# Patient Record
Sex: MALE | Race: WHITE | Employment: OTHER | URBAN - METROPOLITAN AREA
[De-identification: names, ages, dates, MRNs, and addresses within clinical notes are randomized per-mention and may not be internally consistent; named-entity substitution may affect disease eponyms.]

---

## 2017-01-04 ENCOUNTER — TELEPHONE (OUTPATIENT)
Dept: FAMILY MEDICINE | Facility: OTHER | Age: 67
End: 2017-01-04

## 2017-01-04 ENCOUNTER — ANTICOAGULATION THERAPY VISIT (OUTPATIENT)
Dept: ANTICOAGULATION | Facility: OTHER | Age: 67
End: 2017-01-04
Payer: COMMERCIAL

## 2017-01-04 ENCOUNTER — TRANSFERRED RECORDS (OUTPATIENT)
Dept: HEALTH INFORMATION MANAGEMENT | Facility: CLINIC | Age: 67
End: 2017-01-04

## 2017-01-04 DIAGNOSIS — Z86.711 HISTORY OF PULMONARY EMBOLISM: ICD-10-CM

## 2017-01-04 DIAGNOSIS — Z86.711 HISTORY OF PULMONARY EMBOLISM: Primary | ICD-10-CM

## 2017-01-04 DIAGNOSIS — Z79.01 LONG-TERM (CURRENT) USE OF ANTICOAGULANTS: Primary | ICD-10-CM

## 2017-01-04 LAB
INR PPP: 1.9
INR PPP: 1.9

## 2017-01-04 PROCEDURE — 99207 ZZC NO CHARGE NURSE ONLY: CPT | Performed by: FAMILY MEDICINE

## 2017-01-04 NOTE — TELEPHONE ENCOUNTER
Called patient and gave him his results below. Patient states that every Friday they put his pills in his box and he takes them every day, including his statin. He also stated that this week he has a heart test at the hospital, he is not sure what the test is called. He just wanted Dr. Hernandez to know. Patient said he has an appt with Dr. Hernandez at the end of the month and is wondering if you can recheck his liver enzymes then?    Notes Recorded by Gordo Hernandez MD on 1/4/2017 at 2:56 PM  All of your labs were normal for you except your cholesterol is a bit worse.  Have you missed your statin dose lately?  Your liver enzymes were also elevated.  This might be related to your heart failure.  We should recheck this within a few months to be sure it's not worsening.    Thanks,    Dr. David Rodriges MA

## 2017-01-04 NOTE — PROGRESS NOTES
ANTICOAGULATION FOLLOW-UP CLINIC VISIT    Patient Name:  Ric Ferrari  Date:  1/4/2017  Contact Type:  Telephone    SUBJECTIVE:     Patient Findings     Comments [facility] at [facility]           OBJECTIVE    INR   Date Value Ref Range Status   01/04/2017 1.9  Final       ASSESSMENT / PLAN  INR assessment THER    Recheck INR In: 3 WEEKS    INR Location Outside lab      Anticoagulation Summary as of 1/4/2017     INR goal 2.0-3.0   Selected INR 1.9! (1/4/2017)   Maintenance plan 10 mg (5 mg x 2) on Fri; 7.5 mg (5 mg x 1.5) all other days   Full instructions 10 mg on Fri; 7.5 mg all other days   Weekly total 55 mg   Plan last modified Rubi Medina RN (1/4/2017)   Next INR check 1/25/2017   Target end date     Indications   Long-term (current) use of anticoagulants [Z79.01] [Z79.01]  History of pulmonary embolism [Z86.711]         Anticoagulation Episode Summary     INR check location     Preferred lab     Send INR reminders to [facility] POOL    Comments 5 mg tablets, pm dose      Anticoagulation Care Providers     Provider Role Specialty Phone number    Margi Figueroa MD [facility] [phone]            See the Encounter Report to view Anticoagulation Flowsheet and Dosing Calendar (Go to Encounters tab in chart review, and find the Anticoagulation Therapy Visit)    Dosage adjustment made based on physician directed care plan.    Rubi Medina RN

## 2017-01-04 NOTE — TELEPHONE ENCOUNTER
[facility] called. Patient has a standing order for INR point of care, but needs an order for INR lab.   Order placed and faxed to clinic at [phone].   Notified to please fax results to [facility].   Monserrat Dickerson RN

## 2017-01-04 NOTE — TELEPHONE ENCOUNTER
Standing orders for INR point of care faxed to [facility] lab. Fax # [phone]  Attempted to contact patient at home to verify that he was going for lab only, but no answer.   Monserrat Dickerson RN

## 2017-01-04 NOTE — TELEPHONE ENCOUNTER
Patient is having his INR done at the  [facility] Can you please fax orders for that to be done?  He did not have the fax number or the name of anyone to send it to. He just said he is one his way there and he wants to get this done ASAP

## 2017-01-06 ENCOUNTER — TELEPHONE (OUTPATIENT)
Dept: NURSING | Facility: CLINIC | Age: 67
End: 2017-01-06

## 2017-01-06 ENCOUNTER — TRANSFERRED RECORDS (OUTPATIENT)
Dept: HEALTH INFORMATION MANAGEMENT | Facility: CLINIC | Age: 67
End: 2017-01-06

## 2017-01-06 LAB — EJECTION FRACTION: 38

## 2017-01-07 NOTE — TELEPHONE ENCOUNTER
"Call Type: Triage Call    Presenting Problem: Patient had a \"shaky\" episode throughout the  evening till 9pm. Patient states he thinks he is drinking to many  sugary supplemental drinks and that he drank about 8 cups of water  that made him feel better. Patient denies any symptoms at this time.  Triaged for tremor/Disposition to see provider within 72 hours.  Triage Note:  Guideline Title: Tremor  Recommended Disposition: See Provider within 72 Hours  Original Inclination: Wanted to speak with a nurse  Override Disposition:  Intended Action: See Dr/Make Appt  Physician Contacted: No  Any tremor AND no prior evaluation ?  YES  Diagnosed Parkinson's AND tremor suddenly worsening ? NO  Recent seizure OR known seizure disorder ? NO  Sudden change in mental status ? NO  Behavior is combative or violent ? NO  Known chronic liver disease with new tremors that are worse with activity or are  described as flapping ? NO  New onset of tremors at rest associated with difficulty walking (slowness,  stiffness, shuffling steps, loss of balance) ? NO  Seizure hasn't stopped (continuous) OR does not fully awaken between seizures ? NO  New onset of tremors and has recently stopped drinking alcohol after a long  history of use; may also have nausea/vomiting; rapid pulse (more than 120 beats  per minute at rest) ? NO  New onset of tremors AND taking thyroid, asthma or emphysema medication OR has  recently started, stopped or changed dosage of prescription, nonprescription or  alternative/complementary medication(s) within last 7 days. ? NO  First seizure or probable first seizure AND remains not fully alert, confused,  disoriented, or combative 5 minutes or more after seizure has stopped. ? NO  New onset of tremors AND known or strongly suspected use or misuse of alcohol,  illicit or prescribed drug or other substance(s). ? NO  Physician Instructions:  Care Advice: Protect the patient from falling or other harm.  Eliminate or limit use " of caffeine, alcohol or tobacco products.  Call provider if symptoms worsen or new symptoms develop.  CAUTIONS  Some prescription or over-the-counter medications, and herbal or  alternative supplement may cause these symptoms.  SYMPTOM / CONDITION MANAGEMENT  List, or take, all current prescription(s), nonprescription or alternative  medication(s) to provider for evaluation.  Provide additional support to make activities of daily living easier, such  as sit in a high back chair when eating or stabilizing the elbow on the  tabletop. Use adaptive devices like long-handled shoehorns and button  fasteners. Use both hands to accomplish tasks.  DO NOT drive or operate dangerous equipment until condition evaluated.

## 2017-01-10 ENCOUNTER — TELEPHONE (OUTPATIENT)
Dept: FAMILY MEDICINE | Facility: OTHER | Age: 67
End: 2017-01-10

## 2017-01-10 NOTE — TELEPHONE ENCOUNTER
Reason for call:  Patient states returning call to [facility] and that he talks to her and she knows what it is regarding.  [phone]

## 2017-01-10 NOTE — TELEPHONE ENCOUNTER
Pt calling. He would like a call with his echocardiogram results. He states he had this done at [facility] in [place] last Friday. They told him they would send the results yesterday.   Thank you,  Corinna Cazares- Pt Rep.

## 2017-01-10 NOTE — TELEPHONE ENCOUNTER
"Spoke with patient.  Informed him of providers notes below.  Will follow up in clinic on the 25th, but says \"now I have to conserve his money\"    Carl Schwartz RN        "

## 2017-01-10 NOTE — TELEPHONE ENCOUNTER
I don't have results in front of me as they were sent to scanning, but his heart function is dramatically better, EF is about 40%.  It looks great for him.

## 2017-01-10 NOTE — TELEPHONE ENCOUNTER
Spoke with patient.  Double checked what previous note had said  No further questions.    Carl Schwartz RN

## 2017-01-16 ENCOUNTER — TELEPHONE (OUTPATIENT)
Dept: NURSING | Facility: CLINIC | Age: 67
End: 2017-01-16

## 2017-01-17 ENCOUNTER — TELEPHONE (OUTPATIENT)
Dept: FAMILY MEDICINE | Facility: OTHER | Age: 67
End: 2017-01-17

## 2017-01-17 DIAGNOSIS — E11.49 DIABETES MELLITUS TYPE 2 WITH NEUROLOGICAL MANIFESTATIONS (H): Primary | ICD-10-CM

## 2017-01-17 RX ORDER — GABAPENTIN 400 MG/1
800 CAPSULE ORAL 4 TIMES DAILY
Qty: 720 CAPSULE | Refills: 3 | Status: SHIPPED | OUTPATIENT
Start: 2017-01-17 | End: 2017-01-19

## 2017-01-17 NOTE — TELEPHONE ENCOUNTER
[facility] calling wondering what directions to go with for the Gabapentin. Take 2 Capsules (800mg) by mouth 4 times daily or take one capsule by mouth 4 times daily in the morning, noon, evening, and bedtime? The RX that was sent over had both on there. Provider please advise.     Sanam Rodriges MA

## 2017-01-17 NOTE — TELEPHONE ENCOUNTER
Call Type: Triage Call    Presenting Problem: Questions about Neurontin medication.  Triage Note:  Guideline Title: Medication Questions - Adult  Recommended Disposition: Provide Health Information  Original Inclination: Wanted to speak with a nurse  Override Disposition:  Intended Action: Follow advice given  Physician Contacted: No  Caller has medication question(s) that was answered with available resources ?  YES  Pregnant and has medication questions regarding prescribed and/or nonprescribed  medication(s) not covered by available resources ? NO  Breastfeeding and has medication questions regarding prescribed and/or  nonprescribed medication(s) not covered by available resources ? NO  Requested information on how to safely dispose of  or unused medications ?  NO  Sign(s) or symptom(s) associated with a diagnosed condition or with a new illness  ? NO  Prescription ordered today and not available at pharmacy putting patient at  clinical risk ? NO  Recurrence of a symptom(s) or illness post prescribed medication treatment AND  provider instructed patient to call if symptom(s) returned. ? NO  Unable to obtain prescribed medication related to available resources AND  situation poses immediate clinical risk ? NO  Pharmacy calling to clarify prescription order. ? NO  Requests refill of prescribed medication that does NOT have a valid refill; lack  of medication may cause clinical risk to patient if not available. ? NO  Has questions about prescribed and/or nonprescribed medications not covered by  available resources ? NO  Pharmacy calling with prescription question; answered per department policy. ? NO  Requests refill of prescribed medication without valid refills OR requests refill  of prescribed medication with valid refills but does not have prescription number  (no RX container); lack of medication does not put patient at clinical risk ? NO  Physician Instructions:  Care Advice:

## 2017-01-18 ENCOUNTER — TELEPHONE (OUTPATIENT)
Dept: FAMILY MEDICINE | Facility: OTHER | Age: 67
End: 2017-01-18

## 2017-01-18 ENCOUNTER — OFFICE VISIT (OUTPATIENT)
Dept: PEDIATRICS | Facility: CLINIC | Age: 67
End: 2017-01-18
Payer: COMMERCIAL

## 2017-01-18 ENCOUNTER — TELEPHONE (OUTPATIENT)
Dept: NURSING | Facility: CLINIC | Age: 67
End: 2017-01-18

## 2017-01-18 VITALS
WEIGHT: 304 LBS | BODY MASS INDEX: 35.17 KG/M2 | DIASTOLIC BLOOD PRESSURE: 70 MMHG | HEART RATE: 80 BPM | TEMPERATURE: 96.7 F | HEIGHT: 78 IN | SYSTOLIC BLOOD PRESSURE: 116 MMHG | OXYGEN SATURATION: 96 %

## 2017-01-18 DIAGNOSIS — M79.675 PAIN OF TOE OF LEFT FOOT: Primary | ICD-10-CM

## 2017-01-18 PROCEDURE — 99212 OFFICE O/P EST SF 10 MIN: CPT | Performed by: INTERNAL MEDICINE

## 2017-01-18 NOTE — TELEPHONE ENCOUNTER
"Call Type: Triage Call    Presenting Problem: \"My big toe has been hurting.\" Symptoms starting  yesterday 1/17/17. Reporting great toe pain. Denies any  discoloration. History of Diabetes.  Triage Note:  Guideline Title: Diabetes: Foot Problems  Recommended Disposition: Call Provider within 8 Hours  Original Inclination: Did not know what to do  Override Disposition:  Intended Action: Follow advice given  Physician Contacted: No  New or worsening pain, tenderness or swelling ?  YES  Foreign body that can't be removed ? NO  New onset enlarged or swollen joint AND difficult to move ? NO  New unbearable foot (feet) pain ? NO  New onset foot drop or weakness with sensory changes (numbness, tingling) ? NO  New onset of severe pain AND change in sensation (numb, tingling, or no feeling),  change in color (pale or blue), feels cool to touch compared to other extremity.  ? NO  Any full thickness puncture wound (passes through the skin layers of epidermis and  dermis and penetrates into the underlying subcutaneous tissue) OR a puncture  wound where the bottom of wound is not visible (even when wound edges are  ) ? NO  Cut, abrasion, or laceration is the primary concern ? NO  Puncture wound is the primary concern ? NO  Any new OR worsening signs and symptoms of soft tissue infection ? NO  Any signs and symptoms of soft tissue infection that have not improved with 48  hours of medical care ? NO  Trauma without associated abrasion, laceration or puncture wound***GO TO  GUIDELINE*** Foot Injury ? NO  Prior injury and history of or potential for compromised circulation (diabetes,  peripheral vascular disease, varicose veins, peripheral edema, etc) ? NO  Physician Instructions:  Care Advice: DO NOT attempt to place any weight on the affected extremity  until evaluated by provider.  "

## 2017-01-18 NOTE — NURSING NOTE
"Chief Complaint   Patient presents with     Musculoskeletal Problem     left foot       Initial /70 mmHg  Pulse 80  Temp(Src) 96.7  F (35.9  C) (Oral)  Ht 6' 6\" (1.981 m)  Wt 304 lb (137.893 kg)  BMI 35.14 kg/m2  SpO2 96% Estimated body mass index is 35.14 kg/(m^2) as calculated from the following:    Height as of this encounter: 6' 6\" (1.981 m).    Weight as of this encounter: 304 lb (137.893 kg).  BP completed using cuff size: asael Beasley CMA    "

## 2017-01-18 NOTE — PROGRESS NOTES
SUBJECTIVE:                                                    Ric Ferrari is a 66 year old male who presents to clinic today for the following health issues:      Joint Pain     Onset: Yesterday     Description:   Location: left foot   Character: Dull ache, sharp when hit    Intensity: mild, moderate    Progression of Symptoms: same    Accompanying Signs & Symptoms:  Other symptoms: none, pt. States both feet are always numb due to diabetes   History:   Previous similar pain: YES      Precipitating factors:   Trauma or overuse: no     Alleviating factors:  Improved by: nothing       Therapies Tried and outcome: Nothing      HPI:  Pain in left great toe.This began at 5 AM this morning. No known injury but does have numbness in both feet due to diabetic neuropathy    PMH:   Patient Active Problem List   Diagnosis     Insomnia     Diabetic peripheral neuropathy (H)     Bipolar disorder (H)     Accelerated hypertension     Hyperlipidemia LDL goal <100     CAD (coronary artery disease)     Hypothyroidism     TBI (traumatic brain injury) (H)     Type 2 diabetes, HbA1C goal < 8% (H)     Advanced directives, counseling/discussion     Major depressive disorder, recurrent episode, in partial or unspecified remission     Other and unspecified factitious illness     Personality disorder     Generalized weakness     Falls frequently     Conversion disorder     Atypical chest pain     LLQ abdominal pain     Gallstones     DVT prophylaxis     Ischemic cardiomyopathy     MRSA in [facility] 4/13     Weakness     Dizziness     Ataxia - chronic     Tremor - chronic     Diaphoresis     Abnormal urinalysis ?UTI     Psychiatric illness     Hypoglycemia     Syncope     Coronary atherosclerosis of native coronary artery     Old myocardial infarction     Chest heaviness     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)     Left bundle branch block     Neutrophilic leukocytosis     Serotonin syndrome     Leg weakness,  bilateral     Hyperglycemia     Anxiety     Accelerating angina (H)     CAD S/P percutaneous coronary angioplasty     Acute on chronic systolic heart failure (H)     Insomnia     Left arm pain     Right leg pain     Observed sleep apnea     History of pulmonary embolism     Chronic pain     Long term current use of anticoagulant therapy     Chronic pain syndrome     Long-term (current) use of anticoagulants [Z79.01]     Diastolic congestive heart failure, unspecified congestive heart failure chronicity (H)     S/P cardiac pacemaker procedure     Chronic diastolic congestive heart failure (H)     Health Care Home     Past Surgical History   Procedure Laterality Date     Orthopedic surgery       right index finger partial amputation     Stent, coronary, ana         Social History   Substance Use Topics     Smoking status: Former Smoker     Types: Cigars     Quit date: 10/01/2013     Smokeless tobacco: Never Used      Comment: 1 cigar a week     Alcohol Use: No     Family History   Problem Relation Age of Onset     DIABETES Paternal Uncle           Hypertension Father      CEREBROVASCULAR DISEASE Father           HEART DISEASE Father      MI     Alcohol/Drug Father      Neurologic Disorder Father      seizures     Alcohol/Drug Brother      Alcohol/Drug Sister      Alzheimer Disease Mother          Current Outpatient Prescriptions   Medication Sig Dispense Refill     gabapentin (NEURONTIN) 400 MG capsule Take 2 capsules (800 mg) by mouth 4 times daily 720 capsule 3     atorvastatin (LIPITOR) 10 MG tablet Take 1 tablet (10 mg) by mouth daily 90 tablet 1     levothyroxine (SYNTHROID/LEVOTHROID) 75 MCG tablet TAKE ONE TABLET BY MOUTH ONCE DAILY 30 MINUTES BEFORE BREAKFAST 90 tablet 0     lisinopril (PRINIVIL/ZESTRIL) 2.5 MG tablet Take 1 tablet (2.5 mg) by mouth daily 90 tablet 3     metFORMIN (GLUCOPHAGE) 1000 MG tablet Take 1 tablet (1,000 mg) by mouth 2 times daily (with meals) 180 tablet 1      metoprolol (TOPROL-XL) 25 MG 24 hr tablet TAKE 1/2 TABLET (12.5MG) BY MOUTH TWO TIMES A DAY 90 tablet 1     sotalol (BETAPACE) 80 MG tablet Take 1 tablet (80 mg) by mouth daily 90 tablet 1     spironolactone (ALDACTONE) 25 MG tablet TAKE ONE-HALF TABLET BY MOUTH EVERY MORNING 45 tablet 1     warfarin (COUMADIN) 5 MG tablet Take 5 mg (1 tab) Tuesday and 7.5 mg (1.5 tab) all other days or as directed by Coumadin clinic.  NEED INR FOR ADDITIONAL REFILLS. 40 tablet 0     warfarin (COUMADIN) 5 MG tablet TAKE 1 TABLETS (5 mg)  BY MOUTH ON Tuesday and TAKE 1.5 TABLETs (7.5 mg)  ON ALL OTHER DAYS OF THE WEEK OR AS DIRECTED BY THE COUMADIN CLINIC 192 tablet 0     gabapentin (NEURONTIN) 400 MG capsule TAKE ONE CAPSULE BY MOUTH FOUR TIMES A  capsule 1     clonazePAM (KLONOPIN) 0.5 MG tablet Take 1 tablet (0.5 mg) by mouth 3 times daily as needed for anxiety 90 tablet 1     order for DME Equipment being ordered: Diabetic Shoes, 1 pair 1 Device 0     order for DME Equipment being ordered: Lift Chair 1 Device 0     PARoxetine (PAXIL) 20 MG tablet 30 mg        lamoTRIgine (LAMICTAL) 100 MG tablet        guaiFENesin (MUCINEX) 600 MG 12 hr tablet Take 1,200 mg by mouth 2 times daily       cetirizine (ZYRTEC) 10 MG tablet Take 10 mg by mouth daily       dextromethorphan (DELSYM) 30 MG/5ML liquid Take 60 mg by mouth 2 times daily       omeprazole (PRILOSEC) 20 MG capsule TAKE ONE CAPSULE BY MOUTH ONCE DAILY 90 capsule 2     torsemide (DEMADEX) 20 MG tablet        furosemide (LASIX) 20 MG tablet TAKE 2 TABLETS BY MOUTH IN THE MORNING AND 1 TABLET BY MOUTH IN THE EVENING 90 tablet 9     ACCU-CHEK SMARTVIEW test strip USE TO TEST BLOOD SUGARS 4 TIMES DAILY BEFORE MEALS AND AT BEDTIME 400 each 2     order for DME Equipment being ordered: Nebulizer 1 Device 0     order for DME Equipment being ordered: Nebulizer 1 Device 0     blood glucose monitoring (ACCU-CHEK SMARTVIEW) test strip Use to test blood sugar 4 times daily before meals  "and bedtime 400 each 5     blood glucose monitoring (ACCU-CHEK MULTICLIX) lancets Use to test blood sugar 4 times daily. 1 Box 5     Docusate Sodium (COLACE PO) Take by mouth as needed for constipation       polyethylene glycol (MIRALAX/GLYCOLAX) powder Take 1 capful by mouth as needed for constipation       risperiDONE (RISPERDAL) 2 MG tablet TAKE ONE TABLET BY MOUTH AT BEDTIME 30 tablet 2     insulin pen needle (B-D U/F) 31G X 8 MM Use with insulin daily or as directed. 100 each 0     multivitamin, therapeutic with minerals (MULTI-VITAMIN) TABS Take 1 tablet by mouth daily (Patient taking differently: Take 1 tablet by mouth daily AT NOON) 100 tablet 3     acetaminophen (TYLENOL) 325 MG tablet Take 2 tablets (650 mg) by mouth every 4 hours as needed for mild pain (Patient taking differently: Take 500 mg by mouth every 4 hours as needed for mild pain ) 100 tablet      [DISCONTINUED] insulin aspart (NOVOLOG PEN) 100 UNIT/ML soln 2 units/carb with meals subcutaneously 1 Month 0     Allergies   Allergen Reactions     No Known Drug Allergies        ROS:  C: NEGATIVE for fever, chills, change in weight    OBJECTIVE:                                                    /70 mmHg  Pulse 80  Temp(Src) 96.7  F (35.9  C) (Oral)  Ht 6' 6\" (1.981 m)  Wt 304 lb (137.893 kg)  BMI 35.14 kg/m2  SpO2 96%  Body mass index is 35.14 kg/(m^2).  GENERAL: healthy, alert and no distress  Diabetic Left  foot exam: normal DP and PT pulses, reduced sensation , dry cracking heels, onychomycosis and nail exam nails are thickened. No redness or tenderness    Diagnostic Test Results:  none      ASSESSMENT/PLAN:                                                    1. Pain of toe of left foot    No sign of infection.  This patient is in need of podiatry care. He lives in [place] and he wanted to see his podiatrist near his home and I urged a urgent appointment.          Robert Mosqueda MD  [facility]    "

## 2017-01-18 NOTE — TELEPHONE ENCOUNTER
Was seen with DESIREE  Gabapentin calling in to say that it works great.  Advised him to take as provider told him.   One capsule 4 times daily and 2 at bedtime.    Carl Schwartz RN

## 2017-01-19 ENCOUNTER — TELEPHONE (OUTPATIENT)
Dept: FAMILY MEDICINE | Facility: OTHER | Age: 67
End: 2017-01-19

## 2017-01-19 DIAGNOSIS — E11.49 DIABETES MELLITUS TYPE 2 WITH NEUROLOGICAL MANIFESTATIONS (H): Primary | ICD-10-CM

## 2017-01-19 RX ORDER — GABAPENTIN 400 MG/1
400-800 CAPSULE ORAL 4 TIMES DAILY
Qty: 540 CAPSULE | Refills: 3 | Status: SHIPPED | OUTPATIENT
Start: 2017-01-19

## 2017-01-19 NOTE — TELEPHONE ENCOUNTER
Gabapentin 400 mg [facility]  Pharmacy sent fax that Pt states his dose should be:  1 am, 1 noon, 1 pm, and 2 qhs Please verify...    * ins. Will only pay for max 6/day

## 2017-01-20 DIAGNOSIS — E11.49 DIABETES MELLITUS TYPE 2 WITH NEUROLOGICAL MANIFESTATIONS (H): Primary | ICD-10-CM

## 2017-01-20 RX ORDER — GABAPENTIN 400 MG/1
400-800 CAPSULE ORAL 4 TIMES DAILY
Qty: 540 CAPSULE | Refills: 3 | Status: CANCELLED | OUTPATIENT
Start: 2017-01-20

## 2017-01-20 NOTE — TELEPHONE ENCOUNTER
[facility],  URGENT Patient needs ASAP, please verify Dosage Patient states his dose should be: 1am, 1 noon, 1pm, & 2qhs., please verify, Insurance will only pay for max 6/day.    Gabapentin 400 MG      Last Written Prescription Date:  11/25/2016  Last Fill Quantity: 30,   # refills: 1  Last Office Visit with [facility] prescribing provider: 1/18/2017  Future Office visit:    Next 5 appointments (look out 90 days)     Jan 25, 2017 11:15 AM   [facility]    [address] [id number] [phone]                   Routing refill request to provider for review/approval because:  Drug not on the [facility] refill protocol or controlled substance

## 2017-01-23 NOTE — TELEPHONE ENCOUNTER
Spoke to [facility] in [place] verified that RX was sent. Everything is ok this is no longer needed.   Arlet Muhammad CMA ([facility])

## 2017-01-23 NOTE — PROGRESS NOTES
SUBJECTIVE:                                                    Ric Ferrari is a 66 year old male who presents to clinic today for the following health issues:      Shakiness  He tried stopping the anxiety medication, and this got worse.  Resumed previous dose.    He increase the gabapentin, feels much better.  His shakiness is basically gone at this point.      Still quite fatigued.  Didn't notice if his fatigue changed or not on reduce dose of clonazepam.  Fatigue isn't clearly worse with the increased gabapentin dose.    Diabetes Follow-up      Patient is checking blood sugars: not at all ( states hes not anymore)    Diabetic concerns: other - unable to have any sugar like pop or anything makes him very shaky      Symptoms of hypoglycemia (low blood sugar): shaky, dizzy, weak, blurred vision     Paresthesias (numbness or burning in feet) or sores: Yes, still some numbness.  No burning.  Gabapentin seems to work well for this.     Date of last diabetic eye exam: overdue- pt aware      Hyperlipidemia Follow-Up      Rate your low fat/cholesterol diet?: not monitoring fat    Taking statin?  Yes, no muscle aches from statin    Other lipid medications/supplements?:  none     Vascular Disease Follow-up:  Coronary Artery Disease (CAD)      Chest pain or pressure, left side neck or arm pain: No    Shortness of breath/increased sweats/nausea with exertion: pt states ye     Pain in calves walking 1-2 blocks: No    Worsened or new symptoms since last visit: No    Nitroglycerin use: no    Daily aspirin use: no he forgets      Heart Failure Follow-up    Symptoms:    Shortness of breath: none    Lower extremity edema: none    Chest pain: No    Using more pillows than normal: No    Cough at night: No    Weight:    Checking weight daily: Yes    Weight change: none    Cardiology visits, ER/UC, or hospital admissions since last visit: None    Medication side effects: none   Echo showed better function, EF to 40%.  Still with  thickened, enlarged LV.        Depression and Anxiety Follow-Up    Status since last visit: No change    Other associated symptoms:None    Complicating factors:     Significant life event: No     Current substance abuse: None    PHQ-9 SCORE 9/27/2016 12/28/2016 1/25/2017   Total Score - - -   Total Score 3 5 5     DEVORAH-7 SCORE 6/2/2016 12/28/2016 1/25/2017   Total Score 1 0 0        PHQ-9  English      PHQ-9   Any Language     GAD7           Amount of exercise or physical activity: None    Problems taking medications regularly: No    Medication side effects: none    Diet: regular (no restrictions)      Problem list and histories reviewed & adjusted, as indicated.  Additional history: as documented    Current Outpatient Prescriptions   Medication Sig Dispense Refill     gabapentin (NEURONTIN) 400 MG capsule Take 1-2 capsules (400-800 mg) by mouth 4 times daily 540 capsule 3     atorvastatin (LIPITOR) 10 MG tablet Take 1 tablet (10 mg) by mouth daily 90 tablet 1     levothyroxine (SYNTHROID/LEVOTHROID) 75 MCG tablet TAKE ONE TABLET BY MOUTH ONCE DAILY 30 MINUTES BEFORE BREAKFAST 90 tablet 0     lisinopril (PRINIVIL/ZESTRIL) 2.5 MG tablet Take 1 tablet (2.5 mg) by mouth daily 90 tablet 3     metFORMIN (GLUCOPHAGE) 1000 MG tablet Take 1 tablet (1,000 mg) by mouth 2 times daily (with meals) 180 tablet 1     metoprolol (TOPROL-XL) 25 MG 24 hr tablet TAKE 1/2 TABLET (12.5MG) BY MOUTH TWO TIMES A DAY 90 tablet 1     sotalol (BETAPACE) 80 MG tablet Take 1 tablet (80 mg) by mouth daily 90 tablet 1     spironolactone (ALDACTONE) 25 MG tablet TAKE ONE-HALF TABLET BY MOUTH EVERY MORNING 45 tablet 1     warfarin (COUMADIN) 5 MG tablet Take 5 mg (1 tab) Tuesday and 7.5 mg (1.5 tab) all other days or as directed by Coumadin clinic.  NEED INR FOR ADDITIONAL REFILLS. 40 tablet 0     warfarin (COUMADIN) 5 MG tablet TAKE 1 TABLETS (5 mg)  BY MOUTH ON Tuesday and TAKE 1.5 TABLETs (7.5 mg)  ON ALL OTHER DAYS OF THE WEEK OR AS DIRECTED BY  THE COUMADIN CLINIC 192 tablet 0     gabapentin (NEURONTIN) 400 MG capsule TAKE ONE CAPSULE BY MOUTH FOUR TIMES A  capsule 1     clonazePAM (KLONOPIN) 0.5 MG tablet Take 1 tablet (0.5 mg) by mouth 3 times daily as needed for anxiety 90 tablet 1     order for DME Equipment being ordered: Diabetic Shoes, 1 pair 1 Device 0     order for DME Equipment being ordered: Lift Chair 1 Device 0     PARoxetine (PAXIL) 20 MG tablet 30 mg        lamoTRIgine (LAMICTAL) 100 MG tablet        guaiFENesin (MUCINEX) 600 MG 12 hr tablet Take 1,200 mg by mouth 2 times daily       cetirizine (ZYRTEC) 10 MG tablet Take 10 mg by mouth daily       dextromethorphan (DELSYM) 30 MG/5ML liquid Take 60 mg by mouth 2 times daily       omeprazole (PRILOSEC) 20 MG capsule TAKE ONE CAPSULE BY MOUTH ONCE DAILY 90 capsule 2     torsemide (DEMADEX) 20 MG tablet        furosemide (LASIX) 20 MG tablet TAKE 2 TABLETS BY MOUTH IN THE MORNING AND 1 TABLET BY MOUTH IN THE EVENING 90 tablet 9     ACCU-CHEK SMARTVIEW test strip USE TO TEST BLOOD SUGARS 4 TIMES DAILY BEFORE MEALS AND AT BEDTIME 400 each 2     order for DME Equipment being ordered: Nebulizer 1 Device 0     order for DME Equipment being ordered: Nebulizer 1 Device 0     blood glucose monitoring (ACCU-CHEK SMARTVIEW) test strip Use to test blood sugar 4 times daily before meals and bedtime 400 each 5     blood glucose monitoring (ACCU-CHEK MULTICLIX) lancets Use to test blood sugar 4 times daily. 1 Box 5     Docusate Sodium (COLACE PO) Take by mouth as needed for constipation       polyethylene glycol (MIRALAX/GLYCOLAX) powder Take 1 capful by mouth as needed for constipation       risperiDONE (RISPERDAL) 2 MG tablet TAKE ONE TABLET BY MOUTH AT BEDTIME 30 tablet 2     insulin pen needle (B-D U/F) 31G X 8 MM Use with insulin daily or as directed. 100 each 0     multivitamin, therapeutic with minerals (MULTI-VITAMIN) TABS Take 1 tablet by mouth daily (Patient taking differently: Take 1 tablet  by mouth daily AT NOON) 100 tablet 3     acetaminophen (TYLENOL) 325 MG tablet Take 2 tablets (650 mg) by mouth every 4 hours as needed for mild pain (Patient taking differently: Take 500 mg by mouth every 4 hours as needed for mild pain ) 100 tablet      [DISCONTINUED] insulin aspart (NOVOLOG PEN) 100 UNIT/ML soln 2 units/carb with meals subcutaneously 1 Month 0     Recent Labs   Lab Test  12/28/16   1307  05/05/16   1132  04/05/16   1106 03/27/16 01/23/16   11/17/15   1142   10/19/15   1101   09/01/15   1108   09/12/14   1155   A1C  6.6*  6.5*   --    --    --    --   7.2*   --    --    --    --    < >  5.5   LDL  118*   --    --    --    --    --    --    --    --    --   87   --   74   HDL  39*   --    --    --    --    --    --    --    --    --   36*   --   40*   TRIG  244*   --    --    --    --    --    --    --    --    --   251*   --   142   ALT  113*   --    --   58  64   --    --    < >  75*   --   60   < >  41   CR  0.66   --   0.82  0.74  0.83   < >   --    < >  0.78   < >  0.67   < >  0.79   GFRESTIMATED  >90  Non  GFR Calc     --   >90  Non  GFR Calc    >60  >60   < >   --    < >  >90  Non  GFR Calc     < >  >90  Non  GFR Calc     < >  >90  Non  GFR Calc     GFRESTBLACK  >90   GFR Calc     --   >90   GFR Calc    >60  >60   < >   --    < >  >90   GFR Calc     < >  >90   GFR Calc     < >  >90   GFR Calc     POTASSIUM  4.5   --   3.8  4.3  4.3   < >   --    < >  4.2   < >  3.9   < >  4.3   TSH  1.56   --    --    --    --    --    --    --   2.24   < >   --    < >  5.47*    < > = values in this interval not displayed.      BP Readings from Last 3 Encounters:   01/25/17 110/62   01/18/17 116/70   12/28/16 108/64    Wt Readings from Last 3 Encounters:   01/25/17 307 lb 9.6 oz (139.526 kg)   01/18/17 304 lb (137.893 kg)   12/28/16 300 lb (136.079  "kg)               ROS:  Constitutional, HEENT, cardiovascular, pulmonary, gi and gu systems are negative, except as otherwise noted.  Severe fatigue, but not much SOB, or HUMMEL.    OBJECTIVE:                                                    /62 mmHg  Pulse 74  Temp(Src) 97.1  F (36.2  C) (Temporal)  Resp 18  Ht 6' 6\" (1.981 m)  Wt 307 lb 9.6 oz (139.526 kg)  BMI 35.55 kg/m2  SpO2 97%  Body mass index is 35.55 kg/(m^2).  GENERAL: healthy, alert and no distress  NECK: no adenopathy, no asymmetry, masses, or scars and thyroid normal to palpation  RESP: lungs clear to auscultation - no rales, rhonchi or wheezes  CV: regular rate and rhythm, normal S1 S2, no S3 or S4, no murmur, click or rub, no peripheral edema and peripheral pulses strong  ABDOMEN: soft, nontender, no hepatosplenomegaly, no masses and bowel sounds normal  MS: trace edema.  Tenderness reported over much of his body on exam    Diagnostic Test Results:  Results for orders placed or performed in visit on 01/25/17   Vitamin B12   Result Value Ref Range    Vitamin B12 865 193 - 986 pg/mL   INR point of care   Result Value Ref Range    INR Point of Care 2.1 (H) 0.86 - 1.14     *Note: Due to a large number of results and/or encounters for the requested time period, some results have not been displayed. A complete set of results can be found in Results Review.        ASSESSMENT/PLAN:                                                      BMI:   Estimated body mass index is 35.55 kg/(m^2) as calculated from the following:    Height as of this encounter: 6' 6\" (1.981 m).    Weight as of this encounter: 307 lb 9.6 oz (139.526 kg).   Weight management plan: Discussed healthy diet and exercise guidelines and patient will follow up in 3 months in clinic to re-evaluate.        ICD-10-CM    1. Chronic fatigue R53.82 Vitamin B12     INR point of care   2. Shakiness R25.1    3. Acquired hypothyroidism E03.9 levothyroxine (SYNTHROID/LEVOTHROID) 75 MCG tablet "   4. Anxiety F41.9 clonazePAM (KLONOPIN) 0.5 MG tablet   5. Long term current use of anticoagulant therapy Z79.01 CANCELED: INR   6. Diabetic peripheral neuropathy (H) E11.42    7. Ischemic cardiomyopathy I25.5      1.  I still suspect this is related to medications.  Will try cutting back on clonazepam dose again.  Will check B12.  If not working, could consider Wellbutrin or even ritalin, but I'd prefer to have psych manage that given all the sedating psych meds he's taking.  Gabapentin might be contributing, but it's also clearly helping his aches and pains.  2.  Improved.  Suspect this was related to either anxiety or his neuropathy.  Increased gabapentin has helped.  3.  Currently Controlled.  Continue current regimen.  Call/return if any problems or questions arise.   4.  Fair control.  Continue current regimen.  Call/return if any problems or questions arise.   5.  INR done today.  6.  Currently Controlled.  Continue current regimen.  Call/return if any problems or questions arise.   7.  Clinically controlled.  Continue risk factor reduction.      Patient Instructions   Thank you for visiting [facility]    Try reducing your clonazepam to twice daily again.  This may be contributing to your fatigue.  Can resume previous dose if not helping and other symptoms get worse.    One of your other psych medications might be contributing to the fatigue as well.  Either reducing one of these or adding a stimulant might help with your symptoms.  Wellbutrin would be one option.  I would be willing to add Wellbutrin to your regimen if the above isn't improving.  Can call for this if needed.      Please see me in 2  months for follow up.     You are also due for an eye exam.      If you had imaging scheduled please refer to your radiology prep sheet.    Appointment    Date_______________     Time_____________    Day:   M TU W TH    F    With____________________________    Location_________________________    If you need medication refills, please contact your pharmacy 3 days before your prescriptions runs out. If you are out of refills, your pharmacy will contact contact the clinic.    Contact us or return if questions or concerns.     -Your Care Team:  MD Monserrat Walls PA-C Folake Falaki, MD Anoshirvan Mazhari, MD Kelly White, PAULA    General information about your clinic      Clinic hours:     Lab hours:  Phone [phone]  Monday 7:30 am-7 pm    Monday 8:30 am-6:30 pm  Tuesday-Friday 7:30 am-5 pm   Tuesday-Friday 8:30 am-4:30 pm    Pharmacy hours:  Phone [phone]  Monday 8:30 am-7pm  Tuesday-Friday 8:30am-6 pm                                       Mychart assistance [phone]        We would like to hear from you, how was your visit today?    Regine Mckee  Patient Information Supervisor   Patient Care Supervisor  [facility] [place], and [facility] [place], and [facility]  [phone]           Gordo Hernandez MD, MD  [facility]

## 2017-01-25 ENCOUNTER — OFFICE VISIT (OUTPATIENT)
Dept: FAMILY MEDICINE | Facility: OTHER | Age: 67
End: 2017-01-25
Payer: COMMERCIAL

## 2017-01-25 VITALS
OXYGEN SATURATION: 97 % | SYSTOLIC BLOOD PRESSURE: 110 MMHG | RESPIRATION RATE: 18 BRPM | TEMPERATURE: 97.1 F | DIASTOLIC BLOOD PRESSURE: 62 MMHG | WEIGHT: 307.6 LBS | BODY MASS INDEX: 35.59 KG/M2 | HEART RATE: 74 BPM | HEIGHT: 78 IN

## 2017-01-25 DIAGNOSIS — Z79.01 LONG TERM CURRENT USE OF ANTICOAGULANT THERAPY: ICD-10-CM

## 2017-01-25 DIAGNOSIS — R53.82 CHRONIC FATIGUE: Primary | ICD-10-CM

## 2017-01-25 DIAGNOSIS — E03.9 ACQUIRED HYPOTHYROIDISM: ICD-10-CM

## 2017-01-25 DIAGNOSIS — I25.5 ISCHEMIC CARDIOMYOPATHY: ICD-10-CM

## 2017-01-25 DIAGNOSIS — R25.1 SHAKINESS: ICD-10-CM

## 2017-01-25 DIAGNOSIS — E11.42 DIABETIC PERIPHERAL NEUROPATHY (H): ICD-10-CM

## 2017-01-25 DIAGNOSIS — F41.9 ANXIETY: ICD-10-CM

## 2017-01-25 LAB
INR BLD: 2.1 (ref 0.86–1.14)
VIT B12 SERPL-MCNC: 865 PG/ML (ref 193–986)

## 2017-01-25 PROCEDURE — 36416 COLLJ CAPILLARY BLOOD SPEC: CPT | Performed by: FAMILY MEDICINE

## 2017-01-25 PROCEDURE — 85610 PROTHROMBIN TIME: CPT | Mod: QW | Performed by: FAMILY MEDICINE

## 2017-01-25 PROCEDURE — 82607 VITAMIN B-12: CPT | Performed by: FAMILY MEDICINE

## 2017-01-25 PROCEDURE — 99214 OFFICE O/P EST MOD 30 MIN: CPT | Performed by: FAMILY MEDICINE

## 2017-01-25 RX ORDER — LEVOTHYROXINE SODIUM 75 UG/1
TABLET ORAL
Qty: 90 TABLET | Refills: 3 | Status: SHIPPED | OUTPATIENT
Start: 2017-01-25

## 2017-01-25 RX ORDER — CLONAZEPAM 0.5 MG/1
0.5 TABLET ORAL 3 TIMES DAILY PRN
Qty: 90 TABLET | Refills: 1 | Status: SHIPPED | OUTPATIENT
Start: 2017-01-25

## 2017-01-25 ASSESSMENT — ANXIETY QUESTIONNAIRES
1. FEELING NERVOUS, ANXIOUS, OR ON EDGE: NOT AT ALL
5. BEING SO RESTLESS THAT IT IS HARD TO SIT STILL: NOT AT ALL
GAD7 TOTAL SCORE: 0
IF YOU CHECKED OFF ANY PROBLEMS ON THIS QUESTIONNAIRE, HOW DIFFICULT HAVE THESE PROBLEMS MADE IT FOR YOU TO DO YOUR WORK, TAKE CARE OF THINGS AT HOME, OR GET ALONG WITH OTHER PEOPLE: NOT DIFFICULT AT ALL
7. FEELING AFRAID AS IF SOMETHING AWFUL MIGHT HAPPEN: NOT AT ALL
2. NOT BEING ABLE TO STOP OR CONTROL WORRYING: NOT AT ALL
3. WORRYING TOO MUCH ABOUT DIFFERENT THINGS: NOT AT ALL
6. BECOMING EASILY ANNOYED OR IRRITABLE: NOT AT ALL

## 2017-01-25 ASSESSMENT — PAIN SCALES - GENERAL: PAINLEVEL: MODERATE PAIN (4)

## 2017-01-25 ASSESSMENT — PATIENT HEALTH QUESTIONNAIRE - PHQ9: 5. POOR APPETITE OR OVEREATING: NOT AT ALL

## 2017-01-25 NOTE — MR AVS SNAPSHOT
After Visit Summary   1/25/2017    Ric Ferrari    MRN: [id number]           Patient Information     Date Of Birth          1950        Visit Information        Provider Department      1/25/2017 11:15 AM Gordo Hernandez MD [facility]        Today's Diagnoses     Chronic fatigue    -  1     Shakiness         Acquired hypothyroidism         Anxiety         Long term current use of anticoagulant therapy         Diabetic peripheral neuropathy (H)         Ischemic cardiomyopathy           Care Instructions    Thank you for visiting [facility]    Try reducing your clonazepam to twice daily again.  This may be contributing to your fatigue.  Can resume previous dose if not helping and other symptoms get worse.    One of your other psych medications might be contributing to the fatigue as well.  Either reducing one of these or adding a stimulant might help with your symptoms.  Wellbutrin would be one option.  I would be willing to add Wellbutrin to your regimen if the above isn't improving.  Can call for this if needed.      Please see me in 2  months for follow up.     You are also due for an eye exam.      If you had imaging scheduled please refer to your radiology prep sheet.    Appointment    Date_______________     Time_____________    Day:   M TU W TH F    With____________________________    Location_________________________    If you need medication refills, please contact your pharmacy 3 days before your prescriptions runs out. If you are out of refills, your pharmacy will contact contact the clinic.    Contact us or return if questions or concerns.     -Your Care Team:  MD Monserrat Walls PA-C Folake Falaki, MD Anoshirvan Mazhari, MD Kelly White, PAULA    General information about your clinic      Clinic hours:     Lab hours:  Phone [phone]  Monday 7:30 am-7 pm    Monday 8:30 am-6:30 pm  Tuesday-Friday 7:30 am-5  "pm   Tuesday-Friday 8:30 am-4:30 pm    Pharmacy hours:  Phone [phone]  Monday 8:30 am-7pm  Tuesday-Friday 8:30am-6 pm                                       Jonathanryley assistance [phone]        We would like to hear from you, how was your visit today?    Regine Mckee  Patient Information Supervisor   Patient Care Supervisor  [facility], [facility], and [facility]  [phone]           Follow-ups after your visit        Who to contact     If you have questions or need follow up information about today's clinic visit or your schedule please contact [facility] directly at [phone].  Normal or non-critical lab and imaging results will be communicated to you by Metabolonhart, letter or phone within 4 business days after the clinic has received the results. If you do not hear from us within 7 days, please contact the clinic through Metabolonhart or phone. If you have a critical or abnormal lab result, we will notify you by phone as soon as possible.  Submit refill requests through Genticel or call your pharmacy and they will forward the refill request to us. Please allow 3 business days for your refill to be completed.          Additional Information About Your Visit        Metabolonhart Information     Genticel lets you send messages to your doctor, view your test results, renew your prescriptions, schedule appointments and more. To sign up, go to [email or web address] . Click on \"Log in\" on the left side of the screen, which will take you to the Welcome page. Then click on \"Sign up Now\" on the right side of the page.     You will be asked to enter the access code listed below, as well as some personal information. Please follow the directions to create your username and password.     Your access code is: [email or web address]  Expires: 3/28/2017 12:42 PM     Your access code will  in 90 days. If you need help or a new code, " "please call your [place] clinic or [phone].        Care EveryWhere ID     This is your Care EveryWhere ID. This could be used by other organizations to access your [place] medical records  [id number]        Your Vitals Were     Pulse Temperature Respirations Height BMI (Body Mass Index) Pulse Oximetry    74 97.1  F (36.2  C) (Temporal) 18 6' 6\" (1.981 m) 35.55 kg/m2 97%       Blood Pressure from Last 3 Encounters:   01/25/17 110/62   01/18/17 116/70   12/28/16 108/64    Weight from Last 3 Encounters:   01/25/17 307 lb 9.6 oz (139.526 kg)   01/18/17 304 lb (137.893 kg)   12/28/16 300 lb (136.079 kg)              We Performed the Following     INR     Vitamin B12          Today's Medication Changes          These changes are accurate as of: 1/25/17 12:08 PM.  If you have any questions, ask your nurse or doctor.               These medicines have changed or have updated prescriptions.        Dose/Directions    acetaminophen 325 MG tablet   Commonly known as:  TYLENOL   This may have changed:  how much to take   Used for:  Atypical chest pain        Dose:  650 mg   Take 2 tablets (650 mg) by mouth every 4 hours as needed for mild pain   Quantity:  100 tablet   Refills:  0       multivitamin, therapeutic with minerals Tabs tablet   This may have changed:  additional instructions   Used for:  Tremor, Ataxia, Screen for colon cancer, Screening for diabetic peripheral neuropathy, Tobacco use disorder, Type 2 diabetes, HbA1C goal < 8% (H), History of pulmonary embolism, Hyperlipidemia LDL goal <100, Bipolar disorder (H), Left arm pain        Dose:  1 tablet   Take 1 tablet by mouth daily   Quantity:  100 tablet   Refills:  3            Where to get your medicines      These medications were sent to [facility] - [place] - [address]  [address], [facility] [address]     Phone:  [phone]    - levothyroxine 75 MCG tablet      Some of these will need a paper prescription and others can be bought over " the counter.  Ask your nurse if you have questions.     Bring a paper prescription for each of these medications    - clonazePAM 0.5 MG tablet             Primary Care Provider Office Phone # Fax #    Margi Figueroa [phone]       [facility] [address] [facility] [address]        Thank you!     Thank you for choosing [facility]  for your care. Our goal is always to provide you with excellent care. Hearing back from our patients is one way we can continue to improve our services. Please take a few minutes to complete the written survey that you may receive in the mail after your visit with us. Thank you!             Your Updated Medication List - Protect others around you: Learn how to safely use, store and throw away your medicines at www.disposemymeds.org.          This list is accurate as of: 1/25/17 12:08 PM.  Always use your most recent med list.                   Brand Name Dispense Instructions for use    acetaminophen 325 MG tablet    TYLENOL    100 tablet    Take 2 tablets (650 mg) by mouth every 4 hours as needed for mild pain       atorvastatin 10 MG tablet    LIPITOR    90 tablet    Take 1 tablet (10 mg) by mouth daily       blood glucose monitoring lancets     1 Box    Use to test blood sugar 4 times daily.       * blood glucose monitoring test strip    ACCU-CHEK SMARTVIEW    400 each    Use to test blood sugar 4 times daily before meals and bedtime       * ACCU-CHEK SMARTVIEW test strip   Generic drug:  blood glucose monitoring     400 each    USE TO TEST BLOOD SUGARS 4 TIMES DAILY BEFORE MEALS AND AT BEDTIME       cetirizine 10 MG tablet    zyrTEC     Take 10 mg by mouth daily       clonazePAM 0.5 MG tablet    klonoPIN    90 tablet    Take 1 tablet (0.5 mg) by mouth 3 times daily as needed for anxiety       COLACE PO      Take by mouth as needed for constipation       DELSYM 30 MG/5ML liquid   Generic drug:  dextromethorphan      Take 60 mg by mouth  2 times daily       furosemide 20 MG tablet    LASIX    90 tablet    TAKE 2 TABLETS BY MOUTH IN THE MORNING AND 1 TABLET BY MOUTH IN THE EVENING       * gabapentin 400 MG capsule    NEURONTIN    360 capsule    TAKE ONE CAPSULE BY MOUTH FOUR TIMES A DAY       * gabapentin 400 MG capsule    NEURONTIN    540 capsule    Take 1-2 capsules (400-800 mg) by mouth 4 times daily       insulin pen needle 31G X 8 MM    B-D U/F    100 each    Use with insulin daily or as directed.       lamoTRIgine 100 MG tablet    LaMICtal         levothyroxine 75 MCG tablet    SYNTHROID/LEVOTHROID    90 tablet    TAKE ONE TABLET BY MOUTH ONCE DAILY 30 MINUTES BEFORE BREAKFAST       lisinopril 2.5 MG tablet    PRINIVIL/Zestril    90 tablet    Take 1 tablet (2.5 mg) by mouth daily       metFORMIN 1000 MG tablet    GLUCOPHAGE    180 tablet    Take 1 tablet (1,000 mg) by mouth 2 times daily (with meals)       metoprolol 25 MG 24 hr tablet    TOPROL-XL    90 tablet    TAKE 1/2 TABLET (12.5MG) BY MOUTH TWO TIMES A DAY       MUCINEX 600 MG 12 hr tablet   Generic drug:  guaiFENesin      Take 1,200 mg by mouth 2 times daily       multivitamin, therapeutic with minerals Tabs tablet     100 tablet    Take 1 tablet by mouth daily       omeprazole 20 MG CR capsule    priLOSEC    90 capsule    TAKE ONE CAPSULE BY MOUTH ONCE DAILY       * order for DME     1 Device    Equipment being ordered: Nebulizer       * order for DME     1 Device    Equipment being ordered: Nebulizer       * order for DME     1 Device    Equipment being ordered: Diabetic Shoes, 1 pair       * order for DME     1 Device    Equipment being ordered: Lift Chair       PARoxetine 20 MG tablet    PAXIL     30 mg       polyethylene glycol powder    MIRALAX/GLYCOLAX     Take 1 capful by mouth as needed for constipation       risperiDONE 2 MG tablet    risperDAL    30 tablet    TAKE ONE TABLET BY MOUTH AT BEDTIME       sotalol 80 MG tablet    BETAPACE    90 tablet    Take 1 tablet (80 mg) by  mouth daily       spironolactone 25 MG tablet    ALDACTONE    45 tablet    TAKE ONE-HALF TABLET BY MOUTH EVERY MORNING       torsemide 20 MG tablet    DEMADEX         * warfarin 5 MG tablet    COUMADIN    40 tablet    Take 5 mg (1 tab) Tuesday and 7.5 mg (1.5 tab) all other days or as directed by Coumadin clinic.  NEED INR FOR ADDITIONAL REFILLS.       * warfarin 5 MG tablet    COUMADIN    192 tablet    TAKE 1 TABLETS (5 mg)  BY MOUTH ON Tuesday and TAKE 1.5 TABLETs (7.5 mg)  ON ALL OTHER DAYS OF THE WEEK OR AS DIRECTED BY THE COUMADIN CLINIC       * Notice:  This list has 10 medication(s) that are the same as other medications prescribed for you. Read the directions carefully, and ask your doctor or other care provider to review them with you.

## 2017-01-25 NOTE — PATIENT INSTRUCTIONS
Thank you for visiting [facility]    Try reducing your clonazepam to twice daily again.  This may be contributing to your fatigue.  Can resume previous dose if not helping and other symptoms get worse.    One of your other psych medications might be contributing to the fatigue as well.  Either reducing one of these or adding a stimulant might help with your symptoms.  Wellbutrin would be one option.  I would be willing to add Wellbutrin to your regimen if the above isn't improving.  Can call for this if needed.      Please see me in 2  months for follow up.     You are also due for an eye exam.      If you had imaging scheduled please refer to your radiology prep sheet.    Appointment    Date_______________     Time_____________    Day:   M TU W TH F    With____________________________    Location_________________________    If you need medication refills, please contact your pharmacy 3 days before your prescriptions runs out. If you are out of refills, your pharmacy will contact contact the clinic.    Contact us or return if questions or concerns.     -Your Care Team:  MD Monserrat Walls PA-C Folake Falaki, MD Anoshirvan Mazhari, MD Kelly White, PAULA    General information about your clinic      Clinic hours:     Lab hours:  Phone [phone]  Monday 7:30 am-7 pm    Monday 8:30 am-6:30 pm  Tuesday-Friday 7:30 am-5 pm   Tuesday-Friday 8:30 am-4:30 pm    Pharmacy hours:  Phone [phone]  Monday 8:30 am-7pm  Tuesday-Friday 8:30am-6 pm                                       [facility] assistance [phone]        We would like to hear from you, how was your visit today?    Regine Mckee  Patient Information Supervisor   Patient Care Supervisor  [facility], and [facility], and [facility]  [phone]

## 2017-01-25 NOTE — NURSING NOTE
"Chief Complaint   Patient presents with     Diabetes     Panel Management     flu, asa, eye exam, phq9/pool, urine drug screen       Initial /62 mmHg  Pulse 74  Temp(Src) 97.1  F (36.2  C) (Temporal)  Resp 18  Ht 6' 6\" (1.981 m)  Wt 307 lb 9.6 oz (139.526 kg)  BMI 35.55 kg/m2  SpO2 97% Estimated body mass index is 35.55 kg/(m^2) as calculated from the following:    Height as of this encounter: 6' 6\" (1.981 m).    Weight as of this encounter: 307 lb 9.6 oz (139.526 kg).  BP completed using cuff size: soren Evans LPN      "

## 2017-01-26 ENCOUNTER — TELEPHONE (OUTPATIENT)
Dept: FAMILY MEDICINE | Facility: OTHER | Age: 67
End: 2017-01-26

## 2017-01-26 ENCOUNTER — ANTICOAGULATION THERAPY VISIT (OUTPATIENT)
Dept: ANTICOAGULATION | Facility: OTHER | Age: 67
End: 2017-01-26
Payer: COMMERCIAL

## 2017-01-26 DIAGNOSIS — Z86.711 HISTORY OF PULMONARY EMBOLISM: ICD-10-CM

## 2017-01-26 DIAGNOSIS — Z79.01 LONG-TERM (CURRENT) USE OF ANTICOAGULANTS: Primary | ICD-10-CM

## 2017-01-26 DIAGNOSIS — R53.82 CHRONIC FATIGUE: Primary | ICD-10-CM

## 2017-01-26 PROCEDURE — 99207 ZZC NO CHARGE NURSE ONLY: CPT | Performed by: FAMILY MEDICINE

## 2017-01-26 RX ORDER — BUPROPION HYDROCHLORIDE 150 MG/1
150 TABLET ORAL EVERY MORNING
Qty: 90 TABLET | Refills: 1 | Status: SHIPPED | OUTPATIENT
Start: 2017-01-26 | End: 2017-03-22

## 2017-01-26 ASSESSMENT — ANXIETY QUESTIONNAIRES: GAD7 TOTAL SCORE: 0

## 2017-01-26 ASSESSMENT — PATIENT HEALTH QUESTIONNAIRE - PHQ9: SUM OF ALL RESPONSES TO PHQ QUESTIONS 1-9: 5

## 2017-01-26 NOTE — TELEPHONE ENCOUNTER
Spoke with Ric.   Would like Wellbutrin sent as he is still shaky after taken only 2 clonazepam, so he took the 3rd and he feels better.  Would like 90 day supply as they mail it to him.    Please advise.    Carl Schwartz RN

## 2017-01-26 NOTE — TELEPHONE ENCOUNTER
Reason for call:  After changing Ric medication yesterday he is feeling worse and shaking more.  Please call

## 2017-01-26 NOTE — PROGRESS NOTES
ANTICOAGULATION FOLLOW-UP CLINIC VISIT    Patient Name:  Ric Ferrari  Date:  1/26/2017  Contact Type:  Telephone    SUBJECTIVE:     Patient Findings     Positives No Problem Findings           OBJECTIVE    INR POINT OF CARE   Date Value Ref Range Status   01/25/2017 2.1* 0.86 - 1.14 Final     Comment:     This test is intended for monitoring Coumadin therapy.  Results are not   accurate   in patients with prolonged INR due to factor deficiency.         ASSESSMENT / PLAN  INR assessment THER    Recheck INR In: 3 WEEKS    INR Location Outside lab      Anticoagulation Summary as of 1/26/2017     INR goal 2.0-3.0   Selected INR 2.1 (1/25/2017)   Maintenance plan 10 mg (5 mg x 2) on Fri; 7.5 mg (5 mg x 1.5) all other days   Full instructions 10 mg on Fri; 7.5 mg all other days   Weekly total 55 mg   No change documented Pat Mendoza RN   Plan last modified Rubi Medina RN (1/4/2017)   Next INR check 2/15/2017   Target end date     Indications   Long-term (current) use of anticoagulants [Z79.01] [Z79.01]  History of pulmonary embolism [Z86.711]         Anticoagulation Episode Summary     INR check location     Preferred lab     Send INR reminders to [facility] PO[facility]    Comments 5 mg tablets, pm dose      Anticoagulation Care Providers     Provider Role Specialty Phone number    Margi Figueroa MD [facility] [phone]            See the Encounter Report to view Anticoagulation Flowsheet and Dosing Calendar (Go to Encounters tab in chart review, and find the Anticoagulation Therapy Visit)    Dosage adjustment made based on physician directed care plan.    Pta Mendoza RN

## 2017-01-26 NOTE — PROGRESS NOTES
Quick Note:    All of your labs were normal for you.    Have a nice day!    Dr. Hernandez     ______

## 2017-01-29 ENCOUNTER — TELEPHONE (OUTPATIENT)
Dept: NURSING | Facility: CLINIC | Age: 67
End: 2017-01-29

## 2017-01-29 NOTE — TELEPHONE ENCOUNTER
"Call Type: Triage Call    Presenting Problem: \"Today I have intense pain in left leg.\" Patient  reporting waking with \"sharp pain\" in left leg 2-3 inches above  ankle \"to 6 inches\" up. Leg tender to touch. History of blood clot  in left ankle. Patient reporting ongoing numbness tingling in toes.  Patient plans to call 911 due to lack of transportation.  Triage Note:  Guideline Title: Leg Non-Injury  Recommended Disposition: See ED Immediately  Original Inclination: Did not know what to do  Override Disposition:  Intended Action: Follow advice given  Physician Contacted: No  New onset of severe pain AND change in sensation (numb, tingling, or no feeling),  change in color (pale or blue), feels cool to touch compared to other extremity.  ?  YES  Gradual onset or worsening weakness/paralysis OR inability to purposely move ? NO  Gradual onset or worsening numbness/tingling ? NO  Generalized muscle pain or aching ? NO  Painful spasms or cramping of large muscle groups (back, legs or abdomen) AND  recent heat exposure ? NO  Sudden onset of shortness of breath, chest pain and cough with blood tinged sputum  ? NO  New unexplained weakness/paralysis, change in sensation (numbness or tingling) or  inability to purposely move, especially when one side of body is involved  occurring now or within last 4 hours ? NO  Physician Instructions:  Care Advice: Another adult should drive.  Do not give the patient anything to eat or drink.  Avoid using or placing any weight on the affected extremity until evaluated  by a provider.  Do not massage affected area.  "

## 2017-02-10 DIAGNOSIS — Z86.711 HISTORY OF PULMONARY EMBOLISM: ICD-10-CM

## 2017-02-10 DIAGNOSIS — Z79.01 LONG TERM CURRENT USE OF ANTICOAGULANT THERAPY: Primary | ICD-10-CM

## 2017-02-13 ENCOUNTER — TELEPHONE (OUTPATIENT)
Dept: FAMILY MEDICINE | Facility: OTHER | Age: 67
End: 2017-02-13

## 2017-02-13 RX ORDER — WARFARIN SODIUM 5 MG/1
TABLET ORAL
Qty: 192 TABLET | Refills: 0 | Status: SHIPPED | OUTPATIENT
Start: 2017-02-13 | End: 2017-04-27

## 2017-02-13 NOTE — TELEPHONE ENCOUNTER
Prescription approved per [facility] Refill Protocol.    Pat Mendoza RN, BSN

## 2017-02-13 NOTE — TELEPHONE ENCOUNTER
Reason for call:  Pt calling to talk to Carl EDWARDS and would not say what it was regarding.  Please call back.  thanks

## 2017-02-13 NOTE — TELEPHONE ENCOUNTER
Spoke with patient. States that Chest pain and right arm pain.  Started mid morning.  Was worse and now better.    Advised him that due to hx, pacemaker and current chest pain no matter the side.  Needs to be seen in ED    RECOMMENDED DISPOSITION:  To ED, another person to drive -   Will comply with recommendation: yes   If further questions/concerns or if Sx do not improve, worsen or new Sx develop, call your PCP or [facility] as soon as possible.    NOTES:  Disposition was determined by the first positive assessment question, therefore all previous assessment questions were negative.     Guideline used:chest pain  Telephone Triage Protocols for Nurses, Fourth Edition, Violet Schwartz RN

## 2017-02-27 ENCOUNTER — TELEPHONE (OUTPATIENT)
Dept: FAMILY MEDICINE | Facility: OTHER | Age: 67
End: 2017-02-27

## 2017-02-27 NOTE — TELEPHONE ENCOUNTER
Pt calling. He would like to know why he only got 60 tablets in his last refill of the Metformin instead of the usual 180. Please call.  Thank you,  Corinna Cazares- Pt Rep.

## 2017-02-28 NOTE — TELEPHONE ENCOUNTER
Looks like we did refill for the 180 tablets back in December. Could this be an insurance thing? Any thoughts? Please review and advise. Routing to covering provider and provider who did last refill.    Shweta Benito CMA ([facility])

## 2017-02-28 NOTE — TELEPHONE ENCOUNTER
Patient informed. He called the pharmacy, and confirmed they filled an old Rx for the one month supply and has notified them that going forward he would like a 3 month supply filled at a time.    Rachana Gaspar MA

## 2017-02-28 NOTE — TELEPHONE ENCOUNTER
They either filled it from an old Rx that was 60 at a time or his insurance only allows 60 at a time.  He'll have to ask the pharmacy as we haven't changed anything.    Thanks,    Dr. Hernandez

## 2017-03-15 NOTE — PROGRESS NOTES
"  SUBJECTIVE:                                                    Ric Ferrari is a 66 year old male who presents to clinic today for the following health issues:  Doesn't check his blood sugars    Doesn't feel good, exhausted, hasn't been walking well lately    Diabetes Follow-up      Patient is checking blood sugars: not at all    Diabetic concerns: None     Symptoms of hypoglycemia (low blood sugar): none     Paresthesias (numbness or burning in feet) or sores: Yes numbness     Date of last diabetic eye exam: about a month ago, cataracts     Hyperlipidemia Follow-Up      Rate your low fat/cholesterol diet?: good    Taking statin?  Yes, possible muscle aches from statin    Other lipid medications/supplements?:  none     Heart Failure Follow-up    Symptoms:    Shortness of breath: yes but very rarely    Lower extremity edema: none    Chest pain: Yes-  Maybe once a month, a shock    Using more pillows than normal: No sleeps in lounge chair    Cough at night: No    Weight:    Checking weight daily: Yes    Weight change: goes up 5 lbs down 10 lbs, just goes like that    Cardiology visits, ER/UC, or hospital admissions since last visit: None    Medication side effects: None       Continues to report low energy.  Legs are starting to \"stiffen up\".  It hurts when he walks with a cane.  Pain is below the knee on both sides.      He also notes some difficulty with his right hand, extending his hands.    Depression and Anxiety Follow-Up    Status since last visit: No change    Other associated symptoms:just tired all the time, use of legs is almost gone    Complicating factors:     Significant life event: No     Current substance abuse: None    PHQ-9 SCORE 9/27/2016 12/28/2016 1/25/2017   Total Score - - -   Total Score 3 5 5     DEVORAH-7 SCORE 6/2/2016 12/28/2016 1/25/2017   Total Score 1 0 0        PHQ-9  English      PHQ-9   Any Language     GAD7       Amount of exercise or physical activity: None to very " lu.    Problems taking medications regularly: No    Medication side effects: none  Diet: low salt, low fat/cholesterol and diabetic      Problem list and histories reviewed & adjusted, as indicated.  Additional history: as documented    Current Outpatient Prescriptions   Medication Sig Dispense Refill     aspirin EC 81 MG EC tablet Take 81 mg by mouth       warfarin (COUMADIN) 5 MG tablet Take 2 tablets (10 mg) on Fridays and take 1 1/2 tablets (7.5 mg ) all other days or as directed by them coumadin clinic. 192 tablet 0     buPROPion (WELLBUTRIN XL) 150 MG 24 hr tablet Take 1 tablet (150 mg) by mouth every morning 90 tablet 1     levothyroxine (SYNTHROID/LEVOTHROID) 75 MCG tablet TAKE ONE TABLET BY MOUTH ONCE DAILY 30 MINUTES BEFORE BREAKFAST 90 tablet 3     clonazePAM (KLONOPIN) 0.5 MG tablet Take 1 tablet (0.5 mg) by mouth 3 times daily as needed for anxiety 90 tablet 1     gabapentin (NEURONTIN) 400 MG capsule Take 1-2 capsules (400-800 mg) by mouth 4 times daily 540 capsule 3     atorvastatin (LIPITOR) 10 MG tablet Take 1 tablet (10 mg) by mouth daily 90 tablet 1     lisinopril (PRINIVIL/ZESTRIL) 2.5 MG tablet Take 1 tablet (2.5 mg) by mouth daily 90 tablet 3     metFORMIN (GLUCOPHAGE) 1000 MG tablet Take 1 tablet (1,000 mg) by mouth 2 times daily (with meals) 180 tablet 1     metoprolol (TOPROL-XL) 25 MG 24 hr tablet TAKE 1/2 TABLET (12.5MG) BY MOUTH TWO TIMES A DAY 90 tablet 1     sotalol (BETAPACE) 80 MG tablet Take 1 tablet (80 mg) by mouth daily 90 tablet 1     spironolactone (ALDACTONE) 25 MG tablet TAKE ONE-HALF TABLET BY MOUTH EVERY MORNING 45 tablet 1     gabapentin (NEURONTIN) 400 MG capsule TAKE ONE CAPSULE BY MOUTH FOUR TIMES A  capsule 1     order for DME Equipment being ordered: Diabetic Shoes, 1 pair 1 Device 0     order for DME Equipment being ordered: Lift Chair 1 Device 0     PARoxetine (PAXIL) 20 MG tablet 30 mg        lamoTRIgine (LAMICTAL) 100 MG tablet        guaiFENesin  (MUCINEX) 600 MG 12 hr tablet Take 1,200 mg by mouth 2 times daily       cetirizine (ZYRTEC) 10 MG tablet Take 10 mg by mouth daily       dextromethorphan (DELSYM) 30 MG/5ML liquid Take 60 mg by mouth 2 times daily       omeprazole (PRILOSEC) 20 MG capsule TAKE ONE CAPSULE BY MOUTH ONCE DAILY 90 capsule 2     torsemide (DEMADEX) 20 MG tablet        furosemide (LASIX) 20 MG tablet TAKE 2 TABLETS BY MOUTH IN THE MORNING AND 1 TABLET BY MOUTH IN THE EVENING 90 tablet 9     order for DME Equipment being ordered: Nebulizer 1 Device 0     order for DME Equipment being ordered: Nebulizer 1 Device 0     Docusate Sodium (COLACE PO) Take by mouth as needed for constipation       polyethylene glycol (MIRALAX/GLYCOLAX) powder Take 1 capful by mouth as needed for constipation       risperiDONE (RISPERDAL) 2 MG tablet TAKE ONE TABLET BY MOUTH AT BEDTIME 30 tablet 2     insulin pen needle (B-D U/F) 31G X 8 MM Use with insulin daily or as directed. 100 each 0     multivitamin, therapeutic with minerals (MULTI-VITAMIN) TABS Take 1 tablet by mouth daily (Patient taking differently: Take 1 tablet by mouth daily AT NOON) 100 tablet 3     acetaminophen (TYLENOL) 325 MG tablet Take 2 tablets (650 mg) by mouth every 4 hours as needed for mild pain (Patient taking differently: Take 500 mg by mouth every 4 hours as needed for mild pain ) 100 tablet      morphine (MS CONTIN) 15 MG 12 hr tablet Reported on 3/22/2017       Multiple Vitamin (MULTI-VITAMINS) TABS Take 1 tablet by mouth Reported on 3/22/2017       oxyCODONE (ROXICODONE) 5 MG IR tablet Take 5 mg by mouth Reported on 3/22/2017       senna-docusate (SENOKOT-S;PERICOLACE) 8.6-50 MG per tablet Take 1 tablet by mouth       [DISCONTINUED] warfarin (COUMADIN) 5 MG tablet Take 5 mg (1 tab) Tuesday and 7.5 mg (1.5 tab) all other days or as directed by Coumadin clinic.  NEED INR FOR ADDITIONAL REFILLS. 40 tablet 0     [DISCONTINUED] warfarin (COUMADIN) 5 MG tablet TAKE 1 TABLETS (5 mg)  BY  MOUTH ON Tuesday and TAKE 1.5 TABLETs (7.5 mg)  ON ALL OTHER DAYS OF THE WEEK OR AS DIRECTED BY THE COUMADIN CLINIC 192 tablet 0     ACCU-CHEK SMARTVIEW test strip USE TO TEST BLOOD SUGARS 4 TIMES DAILY BEFORE MEALS AND AT BEDTIME (Patient not taking: Reported on 3/22/2017) 400 each 2     blood glucose monitoring (ACCU-CHEK SMARTVIEW) test strip Use to test blood sugar 4 times daily before meals and bedtime (Patient not taking: Reported on 3/22/2017) 400 each 5     blood glucose monitoring (ACCU-CHEK MULTICLIX) lancets Use to test blood sugar 4 times daily. (Patient not taking: Reported on 3/22/2017) 1 Box 5     [DISCONTINUED] insulin aspart (NOVOLOG PEN) 100 UNIT/ML soln 2 units/carb with meals subcutaneously 1 Month 0     Recent Labs   Lab Test  12/28/16   1307  05/05/16   1132  04/05/16   1106 03/27/16 01/23/16   11/17/15   1142   10/19/15   1101   09/01/15   1108   09/12/14   1155   A1C  6.6*  6.5*   --    --    --    --   7.2*   --    --    --    --    < >  5.5   LDL  118*   --    --    --    --    --    --    --    --    --   87   --   74   HDL  39*   --    --    --    --    --    --    --    --    --   36*   --   40*   TRIG  244*   --    --    --    --    --    --    --    --    --   251*   --   142   ALT  113*   --    --   58  64   --    --    < >  75*   --   60   < >  41   CR  0.66   --   0.82  0.74  0.83   < >   --    < >  0.78   < >  0.67   < >  0.79   GFRESTIMATED  >90  Non  GFR Calc     --   >90  Non  GFR Calc    >60  >60   < >   --    < >  >90  Non  GFR Calc     < >  >90  Non  GFR Calc     < >  >90  Non  GFR Calc     GFRESTBLACK  >90   GFR Calc     --   >90   GFR Calc    >60  >60   < >   --    < >  >90   GFR Calc     < >  >90   GFR Calc     < >  >90   GFR Calc     POTASSIUM  4.5   --   3.8  4.3  4.3   < >   --    < >  4.2   < >  3.9   < >  4.3  "  TSH  1.56   --    --    --    --    --    --    --   2.24   < >   --    < >  5.47*    < > = values in this interval not displayed.      BP Readings from Last 3 Encounters:   03/22/17 110/74   01/25/17 110/62   01/18/17 116/70    Wt Readings from Last 3 Encounters:   03/22/17 300 lb (136.1 kg)   01/25/17 (!) 307 lb 9.6 oz (139.5 kg)   01/18/17 (!) 304 lb (137.9 kg)               Reviewed and updated as needed this visit by clinical staff  Tobacco  Allergies  Med Hx  Surg Hx  Fam Hx  Soc Hx      Reviewed and updated as needed this visit by Provider         ROS:  Constitutional, HEENT, cardiovascular, pulmonary, gi and gu systems are negative, except as otherwise noted.    OBJECTIVE:                                                    /74 (BP Location: Left arm, Patient Position: Chair, Cuff Size: Adult Large)  Pulse 73  Temp 94.6  F (34.8  C) (Temporal)  Resp 16  Ht 6' 6\" (1.981 m)  Wt 300 lb (136.1 kg)  SpO2 97%  BMI 34.67 kg/m2  Body mass index is 34.67 kg/(m^2).  GENERAL: healthy, alert and no distress  NECK: no adenopathy, no asymmetry, masses, or scars and thyroid normal to palpation  RESP: lungs clear to auscultation - no rales, rhonchi or wheezes  CV: regular rate and rhythm, normal S1 S2, no S3 or S4, no murmur, click or rub, no peripheral edema and peripheral pulses strong  ABDOMEN: soft, nontender, no hepatosplenomegaly, no masses and bowel sounds normal  MS: no gross musculoskeletal defects noted, no edema  MS: some tenderness on knee palpation bilaterally.  Also with positive Tinel's on right cubital tunnel.    Diagnostic Test Results:  Results for orders placed or performed in visit on 01/25/17   Vitamin B12   Result Value Ref Range    Vitamin B12 865 193 - 986 pg/mL   INR point of care   Result Value Ref Range    INR Point of Care 2.1 (H) 0.86 - 1.14     *Note: Due to a large number of results and/or encounters for the requested time period, some results have not been displayed. A " "complete set of results can be found in Results Review.        ASSESSMENT/PLAN:                                                      BMI:   Estimated body mass index is 34.67 kg/(m^2) as calculated from the following:    Height as of this encounter: 6' 6\" (1.981 m).    Weight as of this encounter: 300 lb (136.1 kg).   Weight management plan: Discussed healthy diet and exercise guidelines and patient will follow up in 6 months in clinic to re-evaluate.        ICD-10-CM    1. Chronic fatigue R53.82 buPROPion (WELLBUTRIN XL) 300 MG 24 hr tablet     HOME CARE NURSING REFERRAL   2. Primary osteoarthritis of right knee M17.11 meloxicam (MOBIC) 15 MG tablet     HOME CARE NURSING REFERRAL   3. 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 Ulnar neuropathy of right upper extremity G56.21 HOME CARE NURSING REFERRAL   5. Long term current use of anticoagulant therapy Z79.01 INR point of care   6. History of pulmonary embolism Z86.711 INR point of care   7. Ischemic cardiomyopathy I25.5    8. CAD S/P percutaneous coronary angioplasty I25.10     Z98.61      1.  Discussed that this is likely multifactorial:  From CHF, from DM with neuropathy, and from some of his medications.  Discussed which ones he can discuss with his psychiatrist.  Will also try increasing Wellbutrin as this seemed to help a bit with his energy previously.  Will also try some PT.  Do home health to ensure his medications are done as they're supposed to be.  2.  Discussed attempts at analgesic control.  If not improving, can do steroid injections and/or refer for possible surgery.  3.  Fair control.  Continue current regimen.  Check labs.  Call/return if any problems or questions arise.   4.  Discussed nature of this, how we can try treating conservatively.  If not improving, will refer for surgical treatment.  5,6.  INR done by " coumadin clinic.  7.  Fair control of edema, but still some fatigue.  Continue current regimen.  Call/return if any problems or questions arise.   8.  Currently Controlled.  Continue risk factor reduction efforts.            Patient Instructions   Thank you for visiting [facility]    Wellbutrin seems to have helped your energy a bit.  Let's increase dose to 300 mg daily.  Be sure to let psychiatry know when you see them next time.  It might be helpful to try to wean down on Risperdal, paroxetine, or clonazepam with them.  Discuss these with psychiatry.    Can try meloxicam to help with your knees.  Stop if abdominal pain, bleeding, or other concerns.  If not improving, we can consider knee injection or referral for possible surgery.    For your right hand and arm pain, can try loosely wrapping a towel around the arm at night to keep it fairly straight.  If not improving, we can consider steroid injection or surgery referral.  The meloxicam may help a bit with this as well.      Please make an appointment with your either myself or your provider  in 3 months for follow up.       If you had imaging scheduled please refer to your radiology prep sheet.    Appointment    Date_______________     Time_____________    Day:   M TU W TH F    With____________________________    Location_________________________    If you need medication refills, please contact your pharmacy 3 days before your prescriptions runs out. If you are out of refills, your pharmacy will contact contact the clinic.    Contact us or return if questions or concerns.     -Your Care Team:  MD Monserrat Walls PA-C Folake Falaki, MD Anoshirvan Mazhari, MD Kelly White, CNP    General information about your clinic      Clinic hours:     Lab hours:  Phone [phone]  Monday 7:30 am-7 pm    Monday 8:30 am-6:30 pm  Tuesday-Friday 7:30 am-5 pm   Tuesday-Friday 8:30 am-4:30 pm    Pharmacy hours:  Phone  [phone]  Monday 8:30 am-7pm  Tuesday-Friday 8:30am-6 pm                                       [facility] [phone]        We would like to hear from you, how was your visit today?    Regine Mckee  Patient Information Supervisor   Patient Care Supervisor  [place] [place], and [facility] [place], [facility]  [phone]   Halie Hernandez MD, MD  [facility]

## 2017-03-16 ENCOUNTER — TELEPHONE (OUTPATIENT)
Dept: FAMILY MEDICINE | Facility: OTHER | Age: 67
End: 2017-03-16

## 2017-03-16 NOTE — TELEPHONE ENCOUNTER
Reason for call:  Symptom  Reason for call:  Patient reporting a symptom    Symptom or request: right arm pain    Duration (how long have symptoms been present): a couple hours, states is getting intense    Have you been treated for this before? No    Additional comments: asking for Carl to call    Phone Number patient can be reached at:  Home number on file [phone] (home)    Best Time:  any    Can we leave a detailed message on this number:  YES    Call taken on 3/16/2017 at 11:56 AM by Michelle Cueva

## 2017-03-16 NOTE — TELEPHONE ENCOUNTER
"Ric Ferrari is a 66 year old male who calls with report of sudden right arm pain for the past couple of days. Pain is located between the elbow and the shoulder.Rates pain 2/10 but can get up to an 8/10.  Severe pain lasts about 10 minutes then decreases.  \"I don't really do activities\" so unable to rate if worse with activity.  No chest pressure.  Pain does not radiate to neck, jaw or shoulder.  Right arm appears white than the left, \"i don't know if it is always like that.\"  Pain increases with movement.  No weakness on Right side.  No neck stiffness. Patient takes Tylenol extra strength but does not help pain. Pain is worsening over the past couple of days.  Pain is intermittent.      NURSING ASSESSMENT:  Description:  Intermittent R arm pain  Onset/duration:  Couple of days  Precip. factors:  Extensive medical hx including HTN, CHF (pacemaker), Hyperlipidemia, DM, Hypothyroidism  Associated symptoms:  See above  Pain scale (0-10)   2/10  Last exam/Treatment:  01/25/2017  Allergies:   Allergies   Allergen Reactions     No Known Drug Allergies        NURSING PLAN: Recommend monitoring symptoms.  If pain radiates to neck/shoulder/jaw, SOB or new/worsening symptoms call back.  If chest pain, should be seen in ED. Patient has appt scheduled for 03/22/2017    RECOMMENDED DISPOSITION:  Home care advice - see nursing plan  Will comply with recommendation: Yes  If further questions/concerns or if symptoms do not improve, worsen or new symptoms develop, call your PCP or [facility] as soon as possible.      Guideline used:Arm or hand problems  Telephone Triage Protocols for Nurses, Fourth Edition, Violet Cole RN    "

## 2017-03-22 ENCOUNTER — TELEPHONE (OUTPATIENT)
Dept: ANTICOAGULATION | Facility: OTHER | Age: 67
End: 2017-03-22

## 2017-03-22 ENCOUNTER — ANTICOAGULATION THERAPY VISIT (OUTPATIENT)
Dept: ANTICOAGULATION | Facility: CLINIC | Age: 67
End: 2017-03-22

## 2017-03-22 ENCOUNTER — OFFICE VISIT (OUTPATIENT)
Dept: FAMILY MEDICINE | Facility: OTHER | Age: 67
End: 2017-03-22
Payer: COMMERCIAL

## 2017-03-22 VITALS
DIASTOLIC BLOOD PRESSURE: 74 MMHG | RESPIRATION RATE: 16 BRPM | SYSTOLIC BLOOD PRESSURE: 110 MMHG | HEART RATE: 73 BPM | OXYGEN SATURATION: 97 % | BODY MASS INDEX: 34.71 KG/M2 | HEIGHT: 78 IN | TEMPERATURE: 94.6 F | WEIGHT: 300 LBS

## 2017-03-22 DIAGNOSIS — Z79.01 LONG-TERM (CURRENT) USE OF ANTICOAGULANTS: ICD-10-CM

## 2017-03-22 DIAGNOSIS — Z98.61 CAD S/P PERCUTANEOUS CORONARY ANGIOPLASTY: ICD-10-CM

## 2017-03-22 DIAGNOSIS — M17.11 PRIMARY OSTEOARTHRITIS OF RIGHT KNEE: ICD-10-CM

## 2017-03-22 DIAGNOSIS — Z86.711 HISTORY OF PULMONARY EMBOLISM: ICD-10-CM

## 2017-03-22 DIAGNOSIS — I25.5 ISCHEMIC CARDIOMYOPATHY: ICD-10-CM

## 2017-03-22 DIAGNOSIS — R53.82 CHRONIC FATIGUE: Primary | ICD-10-CM

## 2017-03-22 DIAGNOSIS — I25.10 CAD S/P PERCUTANEOUS CORONARY ANGIOPLASTY: ICD-10-CM

## 2017-03-22 DIAGNOSIS — E11.49 DIABETES MELLITUS TYPE 2 WITH NEUROLOGICAL MANIFESTATIONS (H): ICD-10-CM

## 2017-03-22 DIAGNOSIS — G56.21 ULNAR NEUROPATHY OF RIGHT UPPER EXTREMITY: ICD-10-CM

## 2017-03-22 DIAGNOSIS — Z79.01 LONG TERM CURRENT USE OF ANTICOAGULANT THERAPY: ICD-10-CM

## 2017-03-22 LAB — INR BLD: 1.7 (ref 0.86–1.14)

## 2017-03-22 PROCEDURE — 85610 PROTHROMBIN TIME: CPT | Mod: QW | Performed by: FAMILY MEDICINE

## 2017-03-22 PROCEDURE — 36416 COLLJ CAPILLARY BLOOD SPEC: CPT | Performed by: FAMILY MEDICINE

## 2017-03-22 PROCEDURE — 99214 OFFICE O/P EST MOD 30 MIN: CPT | Performed by: FAMILY MEDICINE

## 2017-03-22 RX ORDER — MORPHINE SULFATE 15 MG/1
TABLET, FILM COATED, EXTENDED RELEASE ORAL
COMMUNITY

## 2017-03-22 RX ORDER — DIPHENOXYLATE HYDROCHLORIDE AND ATROPINE SULFATE 2.5; .025 MG/1; MG/1
1 TABLET ORAL
COMMUNITY
Start: 2015-02-24

## 2017-03-22 RX ORDER — ASPIRIN 81 MG/1
81 TABLET ORAL
COMMUNITY

## 2017-03-22 RX ORDER — AMOXICILLIN 250 MG
1 CAPSULE ORAL
COMMUNITY

## 2017-03-22 RX ORDER — OXYCODONE HYDROCHLORIDE 5 MG/1
5 TABLET ORAL
COMMUNITY

## 2017-03-22 RX ORDER — MELOXICAM 15 MG/1
7.5-15 TABLET ORAL DAILY PRN
Qty: 30 TABLET | Refills: 3 | Status: SHIPPED | OUTPATIENT
Start: 2017-03-22 | End: 2017-04-27

## 2017-03-22 RX ORDER — BUPROPION HYDROCHLORIDE 300 MG/1
300 TABLET ORAL EVERY MORNING
Qty: 90 TABLET | Refills: 0 | Status: SHIPPED | OUTPATIENT
Start: 2017-03-22 | End: 2017-04-27

## 2017-03-22 ASSESSMENT — PATIENT HEALTH QUESTIONNAIRE - PHQ9: 5. POOR APPETITE OR OVEREATING: SEVERAL DAYS

## 2017-03-22 ASSESSMENT — ANXIETY QUESTIONNAIRES
3. WORRYING TOO MUCH ABOUT DIFFERENT THINGS: NOT AT ALL
1. FEELING NERVOUS, ANXIOUS, OR ON EDGE: NOT AT ALL
IF YOU CHECKED OFF ANY PROBLEMS ON THIS QUESTIONNAIRE, HOW DIFFICULT HAVE THESE PROBLEMS MADE IT FOR YOU TO DO YOUR WORK, TAKE CARE OF THINGS AT HOME, OR GET ALONG WITH OTHER PEOPLE: NOT DIFFICULT AT ALL
GAD7 TOTAL SCORE: 1
7. FEELING AFRAID AS IF SOMETHING AWFUL MIGHT HAPPEN: NOT AT ALL
5. BEING SO RESTLESS THAT IT IS HARD TO SIT STILL: NOT AT ALL
2. NOT BEING ABLE TO STOP OR CONTROL WORRYING: NOT AT ALL
6. BECOMING EASILY ANNOYED OR IRRITABLE: NOT AT ALL

## 2017-03-22 ASSESSMENT — PAIN SCALES - GENERAL: PAINLEVEL: MODERATE PAIN (5)

## 2017-03-22 NOTE — PATIENT INSTRUCTIONS
Thank you for visiting [facility], [place]    Wellbutrin seems to have helped your energy a bit.  Let's increase dose to 300 mg daily.  Be sure to let psychiatry know when you see them next time.  It might be helpful to try to wean down on Risperdal, paroxetine, or clonazepam with them.  Discuss these with psychiatry.    Can try meloxicam to help with your knees.  Stop if abdominal pain, bleeding, or other concerns.  If not improving, we can consider knee injection or referral for possible surgery.    For your right hand and arm pain, can try loosely wrapping a towel around the arm at night to keep it fairly straight.  If not improving, we can consider steroid injection or surgery referral.  The meloxicam may help a bit with this as well.      Please make an appointment with your either myself or your provider  in 3 months for follow up.       If you had imaging scheduled please refer to your radiology prep sheet.    Appointment    Date_______________     Time_____________    Day:   M TU W TH F    With____________________________    Location_________________________    If you need medication refills, please contact your pharmacy 3 days before your prescriptions runs out. If you are out of refills, your pharmacy will contact contact the clinic.    Contact us or return if questions or concerns.     -Your Care Team:  MD Monserrat Walls PA-C Folake Falaki, MD Anoshirvan Mazhari, MD Kelly White, PAULA    General information about your clinic      Clinic hours:     Lab hours:  Phone [phone]  Monday 7:30 am-7 pm    Monday 8:30 am-6:30 pm  Tuesday-Friday 7:30 am-5 pm   Tuesday-Friday 8:30 am-4:30 pm    Pharmacy hours:  Phone [phone]  Monday 8:30 am-7pm  Tuesday-Friday 8:30am-6 pm                                       Mychart assistance [phone]        We would like to hear from you, how was your visit today?    Regine Mckee  Patient Information  Supervisor   Patient Care Supervisor  [place] [place] [place], and [facility]  [phone]   -

## 2017-03-22 NOTE — MR AVS SNAPSHOT
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  Let's increase dose to 300 mg daily.  Be sure to let psychiatry know when you see them next time.  It might be helpful to try to wean down on Risperdal, paroxetine, or clonazepam with them.  Discuss these with psychiatry.    Can try meloxicam to help with your knees.  Stop if abdominal pain, bleeding, or other concerns.  If not improving, we can consider knee injection or referral for possible surgery.    For your right hand and arm pain, can try loosely wrapping a towel around the arm at night to keep it fairly straight.  If not improving, we can consider steroid injection or surgery referral.  The meloxicam may help a bit with this as well.      Please make an appointment with your either myself or your provider  in 3 months for follow up.       If you had imaging scheduled please refer to your radiology prep sheet.    Appointment    Date_______________     Time_____________    Day:   M TU W TH F    With____________________________    Location_________________________    If you need medication refills, please contact your pharmacy 3 days before your prescriptions runs out. If you are out of refills, your pharmacy will contact contact the clinic.    Contact us or return if questions or concerns.     -Your Care Team:  MD Monserrat Walls PA-C Folake Falaki, MD Anoshirvan  MD Isabell Figueroa CNP    General information about your clinic      Clinic hours:     Lab hours:  Phone [phone]  Monday 7:30 am-7 pm    Monday 8:30 am-6:30 pm  Tuesday-Friday 7:30 am-5 pm   Tuesday-Friday 8:30 am-4:30 pm    Pharmacy hours:  Phone [phone]  Monday 8:30 am-7pm  Tuesday-Friday 8:30am-6 pm                                       Hortencia monahan [phone]        We would like to hear from you, how was your visit today?    Regine Mckee  Patient Information Supervisor   Patient Care Supervisor  [facility], and [facility]  [phone]           Follow-ups after your visit        Additional Services     HOME CARE NURSING REFERRAL       **Order classes of: [facility], [facility] and [facility] will route to the Home Care and Hospice Referral Pool.  Home Care or Hospice will then contact the patient to schedule their appointment.**    If you do not hear from Home Care and Hospice, or you would like to call to schedule, please call the referring place of service that your provider has listed below.  ______________________________________________________________________    Your provider has referred you to: [facility]: [facility] [phone]   [email or web address]    Extended Emergency Contact Information  Primary Emergency Contact: Jian Ferrari   [facility]  Home Phone: [phone]  Work Phone: none  Mobile Phone: [phone]  Relation: Son    Patient Anticipated Discharge Date: 3/22/2017    RN, PT, HHA to begin 24 - 48 hours after discharge.  PLEASE EVALUATE AND TREAT (Evaluation timeline is 24 - 48 hrs. Please call if there is need for a variance to this timeline).    REASON FOR REFERRAL: Assessment & Treatment: PT and RN.  Pt is concerned about cost, so please let him know what the costs might be    ADDITIONAL SERVICES  NEEDED: Occupational Therapy, if indicated    OTHER PERTINENT INFORMATION: Patient was last seen by provider on 3/22/2017  for knee arthritis, diabetes.    Current Outpatient Prescriptions:  aspirin EC 81 MG EC tablet, Take 81 mg by mouth, Disp: , Rfl:   warfarin (COUMADIN) 5 MG tablet, Take 2 tablets (10 mg) on Fridays and take 1 1/2 tablets (7.5 mg ) all other days or as directed by them coumadin clinic., Disp: 192 tablet, Rfl: 0  buPROPion (WELLBUTRIN XL) 150 MG 24 hr tablet, Take 1 tablet (150 mg) by mouth every morning, Disp: 90 tablet, Rfl: 1  levothyroxine (SYNTHROID/LEVOTHROID) 75 MCG tablet, TAKE ONE TABLET BY MOUTH ONCE DAILY 30 MINUTES BEFORE BREAKFAST, Disp: 90 tablet, Rfl: 3  clonazePAM (KLONOPIN) 0.5 MG tablet, Take 1 tablet (0.5 mg) by mouth 3 times daily as needed for anxiety, Disp: 90 tablet, Rfl: 1  gabapentin (NEURONTIN) 400 MG capsule, Take 1-2 capsules (400-800 mg) by mouth 4 times daily, Disp: 540 capsule, Rfl: 3  atorvastatin (LIPITOR) 10 MG tablet, Take 1 tablet (10 mg) by mouth daily, Disp: 90 tablet, Rfl: 1  lisinopril (PRINIVIL/ZESTRIL) 2.5 MG tablet, Take 1 tablet (2.5 mg) by mouth daily, Disp: 90 tablet, Rfl: 3  metFORMIN (GLUCOPHAGE) 1000 MG tablet, Take 1 tablet (1,000 mg) by mouth 2 times daily (with meals), Disp: 180 tablet, Rfl: 1  metoprolol (TOPROL-XL) 25 MG 24 hr tablet, TAKE 1/2 TABLET (12.5MG) BY MOUTH TWO TIMES A DAY, Disp: 90 tablet, Rfl: 1  sotalol (BETAPACE) 80 MG tablet, Take 1 tablet (80 mg) by mouth daily, Disp: 90 tablet, Rfl: 1  spironolactone (ALDACTONE) 25 MG tablet, TAKE ONE-HALF TABLET BY MOUTH EVERY MORNING, Disp: 45 tablet, Rfl: 1  gabapentin (NEURONTIN) 400 MG capsule, TAKE ONE CAPSULE BY MOUTH FOUR TIMES A DAY, Disp: 360 capsule, Rfl: 1  order for DME, Equipment being ordered: Diabetic Shoes, 1 pair, Disp: 1 Device, Rfl: 0  order for DME, Equipment being ordered: Lift Chair, Disp: 1 Device, Rfl: 0  PARoxetine (PAXIL) 20 MG tablet, 30 mg , Disp: , Rfl:    lamoTRIgine (LAMICTAL) 100 MG tablet, , Disp: , Rfl:   guaiFENesin (MUCINEX) 600 MG 12 hr tablet, Take 1,200 mg by mouth 2 times daily, Disp: , Rfl:   cetirizine (ZYRTEC) 10 MG tablet, Take 10 mg by mouth daily, Disp: , Rfl:   dextromethorphan (DELSYM) 30 MG/5ML liquid, Take 60 mg by mouth 2 times daily, Disp: , Rfl:   omeprazole (PRILOSEC) 20 MG capsule, TAKE ONE CAPSULE BY MOUTH ONCE DAILY, Disp: 90 capsule, Rfl: 2  torsemide (DEMADEX) 20 MG tablet, , Disp: , Rfl:   furosemide (LASIX) 20 MG tablet, TAKE 2 TABLETS BY MOUTH IN THE MORNING AND 1 TABLET BY MOUTH IN THE EVENING, Disp: 90 tablet, Rfl: 9  order for DME, Equipment being ordered: Nebulizer, Disp: 1 Device, Rfl: 0  order for DME, Equipment being ordered: Nebulizer, Disp: 1 Device, Rfl: 0  Docusate Sodium (COLACE PO), Take by mouth as needed for constipation, Disp: , Rfl:   polyethylene glycol (MIRALAX/GLYCOLAX) powder, Take 1 capful by mouth as needed for constipation, Disp: , Rfl:   risperiDONE (RISPERDAL) 2 MG tablet, TAKE ONE TABLET BY MOUTH AT BEDTIME, Disp: 30 tablet, Rfl: 2  insulin pen needle (B-D U/F) 31G X 8 MM, Use with insulin daily or as directed., Disp: 100 each, Rfl: 0  multivitamin, therapeutic with minerals (MULTI-VITAMIN) TABS, Take 1 tablet by mouth daily (Patient taking differently: Take 1 tablet by mouth daily AT NOON), Disp: 100 tablet, Rfl: 3  acetaminophen (TYLENOL) 325 MG tablet, Take 2 tablets (650 mg) by mouth every 4 hours as needed for mild pain (Patient taking differently: Take 500 mg by mouth every 4 hours as needed for mild pain ), Disp: 100 tablet, Rfl:   morphine (MS CONTIN) 15 MG 12 hr tablet, Reported on 3/22/2017, Disp: , Rfl:   Multiple Vitamin (MULTI-VITAMINS) TABS, Take 1 tablet by mouth Reported on 3/22/2017, Disp: , Rfl:   oxyCODONE (ROXICODONE) 5 MG IR tablet, Take 5 mg by mouth Reported on 3/22/2017, Disp: , Rfl:   senna-docusate (SENOKOT-S;PERICOLACE) 8.6-50 MG per tablet, Take 1 tablet by mouth, Disp: , Rfl:    [DISCONTINUED] warfarin (COUMADIN) 5 MG tablet, Take 5 mg (1 tab) Tuesday and 7.5 mg (1.5 tab) all other days or as directed by Coumadin clinic.  NEED INR FOR ADDITIONAL REFILLS., Disp: 40 tablet, Rfl: 0  [DISCONTINUED] warfarin (COUMADIN) 5 MG tablet, TAKE 1 TABLETS (5 mg)  BY MOUTH ON Tuesday and TAKE 1.5 TABLETs (7.5 mg)  ON ALL OTHER DAYS OF THE WEEK OR AS DIRECTED BY THE COUMADIN CLINIC, Disp: 192 tablet, Rfl: 0  ACCU-CHEK SMARTVIEW test strip, USE TO TEST BLOOD SUGARS 4 TIMES DAILY BEFORE MEALS AND AT BEDTIME (Patient not taking: Reported on 3/22/2017), Disp: 400 each, Rfl: 2  blood glucose monitoring (ACCU-CHEK SMARTVIEW) test strip, Use to test blood sugar 4 times daily before meals and bedtime (Patient not taking: Reported on 3/22/2017), Disp: 400 each, Rfl: 5  blood glucose monitoring (ACCU-CHEK MULTICLIX) lancets, Use to test blood sugar 4 times daily. (Patient not taking: Reported on 3/22/2017), Disp: 1 Box, Rfl: 5  [DISCONTINUED] insulin aspart (NOVOLOG PEN) 100 UNIT/ML soln, 2 units/carb with meals subcutaneously, Disp: 1 Month, Rfl: 0      Patient Active Problem List:     Insomnia     Diabetic peripheral neuropathy (H)     Bipolar disorder (H)     Accelerated hypertension     Hyperlipidemia LDL goal <100     CAD (coronary artery disease)     Hypothyroidism     TBI (traumatic brain injury) (H)     Type 2 diabetes, HbA1C goal < 8% (H)     Advanced directives, counseling/discussion     Major depressive disorder, recurrent episode, in partial or unspecified remission     Other and unspecified factitious illness     Personality disorder     Generalized weakness     Falls frequently     Conversion disorder     Atypical chest pain     LLQ abdominal pain     Gallstones     DVT prophylaxis     Ischemic cardiomyopathy     MRSA in nares 4/13     Weakness     Dizziness     Ataxia - chronic     Tremor - chronic     Diaphoresis     Abnormal urinalysis ?UTI     Psychiatric illness     Hypoglycemia     Syncope      Coronary atherosclerosis of native coronary artery     Old myocardial infarction     Chest heaviness     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)     Left bundle branch block     Neutrophilic leukocytosis     Serotonin syndrome     Leg weakness, bilateral     Hyperglycemia     Anxiety     Accelerating angina (H)     CAD S/P percutaneous coronary angioplasty     Acute on chronic systolic heart failure (H)     Insomnia     Left arm pain     Right leg pain     Observed sleep apnea     History of pulmonary embolism     Chronic pain     Long term current use of anticoagulant therapy     Chronic pain syndrome     Long-term (current) use of anticoagulants [Z79.01]     Diastolic congestive heart failure, unspecified congestive heart failure chronicity (H)     S/P cardiac pacemaker procedure     Chronic diastolic congestive heart failure (H)     Health Care Home      Documentation of Face to Face and Certification for Home Health Services    I certify that patient, Ric Ferrari is under my care and that I, or a Nurse Practitioner or Physician's Assistant working with me, had a face-to-face encounter that meets the physician face-to-face encounter requirements with this patient on: 3/22/2017.    This encounter with the patient was in whole, or in part, for the following medical condition, which is the primary reason for Home Health Care: knee arthritis, diabetes.    I certify that, based on my findings, the following services are medically necessary Home Health Services: Nursing and Physical Therapy    My clinical findings support the need for the above services because: Nurse is needed: To assess medication compliance and response after changes in medications or other medical regimen.., Occupational Therapy Services are needed to assess and treat cognitive ability and address ADL safety due to impairment in cognition. and Physical Therapy Services are needed to assess and treat the following functional  impairments: deconditioning.    Further, I certify that my clinical findings support that this patient is homebound (i.e. absences from home require considerable and taxing effort and are for medical reasons or [facility] services or infrequently or of short duration when for other reasons) because: Requires assistance of another person or specialized equipment to access medical services because patient: Is unable to walk greater than 50 feet without rest..    Based on the above findings, I certify that this patient is confined to the home and needs intermittent skilled nursing care, physical therapy and/or speech therapy.  The patient is under my care, and I have initiated the establishment of the plan of care.  This patient will be followed by a physician who will periodically review the plan of care.    Physician/Provider to provide follow up care: Margi Figueroa    [facility] certified Physician at time of discharge: Gordo Hernandez MD     Please be aware that coverage of these services is subject to the terms and limitations of your health insurance plan.  Call member services at your health plan with any benefit or coverage questions.                  Future tests that were ordered for you today     Open Future Orders        Priority Expected Expires Ordered    **Albumin Random Urine Quant FUTURE 3mo Routine 5/21/2017 7/20/2017 3/22/2017            Who to contact     If you have questions or need follow up information about today's clinic visit or your schedule please contact [facility] directly at [phone].  Normal or non-critical lab and imaging results will be communicated to you by MyChart, letter or phone within 4 business days after the clinic has received the results. If you do not hear from us within 7 days, please contact the clinic through MyChart or phone. If you have a critical or abnormal lab result, we will notify you by phone as soon as possible.  Submit  "refill requests through [employer] or call your pharmacy and they will forward the refill request to us. Please allow 3 business days for your refill to be completed.          Additional Information About Your Visit        [employer] Information     [employer] lets you send messages to your doctor, view your test results, renew your prescriptions, schedule appointments and more. To sign up, go to [email or web address] . Click on \"Log in\" on the left side of the screen, which will take you to the Welcome page. Then click on \"Sign up Now\" on the right side of the page.     You will be asked to enter the access code listed below, as well as some personal information. Please follow the directions to create your username and password.     Your access code is: [email or web address]  Expires: 3/28/2017  1:42 PM     Your access code will  in 90 days. If you need help or a new code, please call your [place] clinic or [phone].        Care EveryWhere ID     This is your Care EveryWhere ID. This could be used by other organizations to access your [place] medical records  [id number]        Your Vitals Were     Pulse Temperature Respirations Height Pulse Oximetry BMI (Body Mass Index)    73 94.6  F (34.8  C) (Temporal) 16 6' 6\" (1.981 m) 97% 34.67 kg/m2       Blood Pressure from Last 3 Encounters:   17 110/74   17 110/62   17 116/70    Weight from Last 3 Encounters:   17 300 lb (136.1 kg)   17 (!) 307 lb 9.6 oz (139.5 kg)   17 (!) 304 lb (137.9 kg)              We Performed the Following     EYE EXAM (SIMPLE-NONBILLABLE)     HOME CARE NURSING REFERRAL          Today's Medication Changes          These changes are accurate as of: 3/22/17 12:13 PM.  If you have any questions, ask your nurse or doctor.               Start taking these medicines.        Dose/Directions    meloxicam 15 MG tablet   Commonly known as:  MOBIC   Used for:  Primary osteoarthritis of right knee   Started by:  Gordo Hernandez " MD Phong        Dose:  7.5-15 mg   Take 0.5-1 tablets (7.5-15 mg) by mouth daily as needed   Quantity:  30 tablet   Refills:  3         These medicines have changed or have updated prescriptions.        Dose/Directions    acetaminophen 325 MG tablet   Commonly known as:  TYLENOL   This may have changed:  how much to take   Used for:  Atypical chest pain        Dose:  650 mg   Take 2 tablets (650 mg) by mouth every 4 hours as needed for mild pain   Quantity:  100 tablet   Refills:  0       buPROPion 300 MG 24 hr tablet   Commonly known as:  WELLBUTRIN XL   This may have changed:    - medication strength  - how much to take   Used for:  Chronic fatigue   Changed by:  Gordo Hernandez MD        Dose:  300 mg   Take 1 tablet (300 mg) by mouth every morning   Quantity:  90 tablet   Refills:  0       multivitamin, therapeutic with minerals Tabs tablet   This may have changed:  additional instructions   Used for:  Tremor, Ataxia, Screen for colon cancer, Screening for diabetic peripheral neuropathy, Tobacco use disorder, Type 2 diabetes, HbA1C goal < 8% (H), History of pulmonary embolism, Hyperlipidemia LDL goal <100, Bipolar disorder (H), Left arm pain        Dose:  1 tablet   Take 1 tablet by mouth daily   Quantity:  100 tablet   Refills:  3            Where to get your medicines      These medications were sent to [address]  [address] [facility] [address]     Phone:  [phone]     buPROPion 300 MG 24 hr tablet    meloxicam 15 MG tablet                Primary Care Provider Office Phone # Fax #    Margi Figueroa [phone]       [facility] [address] [facility] [address]        Thank you!     Thank you for choosing [facility]  for your care. Our goal is always to provide you with excellent care. Hearing back from our patients is one way we can continue to improve our services. Please take a few minutes to complete the  written survey that you may receive in the mail after your visit with us. Thank you!             Your Updated Medication List - Protect others around you: Learn how to safely use, store and throw away your medicines at www.disposemymeds.org.          This list is accurate as of: 3/22/17 12:13 PM.  Always use your most recent med list.                   Brand Name Dispense Instructions for use    acetaminophen 325 MG tablet    TYLENOL    100 tablet    Take 2 tablets (650 mg) by mouth every 4 hours as needed for mild pain       aspirin EC 81 MG EC tablet      Take 81 mg by mouth       atorvastatin 10 MG tablet    LIPITOR    90 tablet    Take 1 tablet (10 mg) by mouth daily       blood glucose monitoring lancets     1 Box    Use to test blood sugar 4 times daily.       * blood glucose monitoring test strip    ACCU-CHEK SMARTVIEW    400 each    Use to test blood sugar 4 times daily before meals and bedtime       * ACCU-CHEK SMARTVIEW test strip   Generic drug:  blood glucose monitoring     400 each    USE TO TEST BLOOD SUGARS 4 TIMES DAILY BEFORE MEALS AND AT BEDTIME       buPROPion 300 MG 24 hr tablet    WELLBUTRIN XL    90 tablet    Take 1 tablet (300 mg) by mouth every morning       cetirizine 10 MG tablet    zyrTEC     Take 10 mg by mouth daily       clonazePAM 0.5 MG tablet    klonoPIN    90 tablet    Take 1 tablet (0.5 mg) by mouth 3 times daily as needed for anxiety       COLACE PO      Take by mouth as needed for constipation       DELSYM 30 MG/5ML liquid   Generic drug:  dextromethorphan      Take 60 mg by mouth 2 times daily       furosemide 20 MG tablet    LASIX    90 tablet    TAKE 2 TABLETS BY MOUTH IN THE MORNING AND 1 TABLET BY MOUTH IN THE EVENING       * gabapentin 400 MG capsule    NEURONTIN    360 capsule    TAKE ONE CAPSULE BY MOUTH FOUR TIMES A DAY       * gabapentin 400 MG capsule    NEURONTIN    540 capsule    Take 1-2 capsules (400-800 mg) by mouth 4 times daily       insulin pen needle 31G X 8  MM    B-D U/F    100 each    Use with insulin daily or as directed.       lamoTRIgine 100 MG tablet    LaMICtal         levothyroxine 75 MCG tablet    SYNTHROID/LEVOTHROID    90 tablet    TAKE ONE TABLET BY MOUTH ONCE DAILY 30 MINUTES BEFORE BREAKFAST       lisinopril 2.5 MG tablet    PRINIVIL/Zestril    90 tablet    Take 1 tablet (2.5 mg) by mouth daily       meloxicam 15 MG tablet    MOBIC    30 tablet    Take 0.5-1 tablets (7.5-15 mg) by mouth daily as needed       metFORMIN 1000 MG tablet    GLUCOPHAGE    180 tablet    Take 1 tablet (1,000 mg) by mouth 2 times daily (with meals)       metoprolol 25 MG 24 hr tablet    TOPROL-XL    90 tablet    TAKE 1/2 TABLET (12.5MG) BY MOUTH TWO TIMES A DAY       morphine 15 MG 12 hr tablet    MS CONTIN     Reported on 3/22/2017       MUCINEX 600 MG 12 hr tablet   Generic drug:  guaiFENesin      Take 1,200 mg by mouth 2 times daily       MULTI-VITAMINS Tabs      Take 1 tablet by mouth Reported on 3/22/2017       multivitamin, therapeutic with minerals Tabs tablet     100 tablet    Take 1 tablet by mouth daily       omeprazole 20 MG CR capsule    priLOSEC    90 capsule    TAKE ONE CAPSULE BY MOUTH ONCE DAILY       * order for DME     1 Device    Equipment being ordered: Nebulizer       * order for DME     1 Device    Equipment being ordered: Nebulizer       * order for DME     1 Device    Equipment being ordered: Diabetic Shoes, 1 pair       * order for DME     1 Device    Equipment being ordered: Lift Chair       oxyCODONE 5 MG IR tablet    ROXICODONE     Take 5 mg by mouth Reported on 3/22/2017       PARoxetine 20 MG tablet    PAXIL     30 mg       polyethylene glycol powder    MIRALAX/GLYCOLAX     Take 1 capful by mouth as needed for constipation       risperiDONE 2 MG tablet    risperDAL    30 tablet    TAKE ONE TABLET BY MOUTH AT BEDTIME       senna-docusate 8.6-50 MG per tablet    SENOKOT-S;PERICOLACE     Take 1 tablet by mouth       sotalol 80 MG tablet    BETAPACE    90  tablet    Take 1 tablet (80 mg) by mouth daily       spironolactone 25 MG tablet    ALDACTONE    45 tablet    TAKE ONE-HALF TABLET BY MOUTH EVERY MORNING       torsemide 20 MG tablet    DEMADEX         warfarin 5 MG tablet    COUMADIN    192 tablet    Take 2 tablets (10 mg) on Fridays and take 1 1/2 tablets (7.5 mg ) all other days or as directed by them coumadin clinic.       * Notice:  This list has 8 medication(s) that are the same as other medications prescribed for you. Read the directions carefully, and ask your doctor or other care provider to review them with you.

## 2017-03-22 NOTE — TELEPHONE ENCOUNTER
Attempted to contact, no answer. No option to leave a VM. Will need to try again later  Shaunna Franco RN

## 2017-03-22 NOTE — NURSING NOTE
"Chief Complaint   Patient presents with     Diabetes     Panel Management     MyChart, Colon cancer screen, ASA, Eye exam, phq, pool, Urine drug screen       Initial /74 (BP Location: Left arm, Patient Position: Chair, Cuff Size: Adult Large)  Pulse 73  Temp 94.6  F (34.8  C) (Temporal)  Resp 16  Ht 6' 6\" (1.981 m)  Wt 300 lb (136.1 kg)  SpO2 97%  BMI 34.67 kg/m2 Estimated body mass index is 34.67 kg/(m^2) as calculated from the following:    Height as of this encounter: 6' 6\" (1.981 m).    Weight as of this encounter: 300 lb (136.1 kg).  Medication Reconciliation: complete  Mike Berkowitz, MIKE    "

## 2017-03-22 NOTE — PROGRESS NOTES
ANTICOAGULATION FOLLOW-UP CLINIC VISIT    Patient Name:  Ric Ferrari  Date:  3/22/2017  Contact Type:  Telephone    SUBJECTIVE:     Patient Findings     Positives Change in diet/appetite (more greens lately, but pt states he would rather stay on the same coumadin dose and not eat as many greens. )           OBJECTIVE    INR Point of Care   Date Value Ref Range Status   03/22/2017 1.7 (H) 0.86 - 1.14 Final     Comment:     This test is intended for monitoring Coumadin therapy.  Results are not   accurate   in patients with prolonged INR due to factor deficiency.         ASSESSMENT / PLAN  INR assessment SUB    Recheck INR In: 3 WEEKS    INR Location Outside lab      Anticoagulation Summary as of 3/22/2017     INR goal 2.0-3.0   Today's INR 1.7!   Maintenance plan 10 mg (5 mg x 2) on Fri; 7.5 mg (5 mg x 1.5) all other days   Full instructions 10 mg on Fri; 7.5 mg all other days   Weekly total 55 mg   No change documented Shaunna Franco RN   Plan last modified Rubi Medina RN (1/4/2017)   Next INR check 4/12/2017   Target end date     Indications   Long-term (current) use of anticoagulants [Z79.01] [Z79.01]  History of pulmonary embolism [Z86.711]         Anticoagulation Episode Summary     INR check location     Preferred lab     Send INR reminders to [facility] POOL    Comments 5 mg tablets, pm dose      Anticoagulation Care Providers     Provider Role Specialty Phone number    Margi Figueroa MD [facility] [phone]            See the Encounter Report to view Anticoagulation Flowsheet and Dosing Calendar (Go to Encounters tab in chart review, and find the Anticoagulation Therapy Visit)    Dosage adjustment made based on physician directed care plan.    Pt will be moving to [place] in a month or so.     Shaunna Franco, JERRY

## 2017-03-22 NOTE — MR AVS SNAPSHOT
Ric Ferrari   3/22/2017   Anticoagulation Therapy Visit    Description:  66 year old male   Provider:  Margi Figueroa MD   Department:  Ph Anticoag           INR as of 3/22/2017     Today's INR 1.7!      Anticoagulation Summary as of 3/22/2017     INR goal 2.0-3.0   Today's INR 1.7!   Full instructions 10 mg on Fri; 7.5 mg all other days   Next INR check 4/12/2017    Indications   Long-term (current) use of anticoagulants [Z79.01] [Z79.01]  History of pulmonary embolism [Z86.711]         Contact Numbers     Clinic Number:         March 2017 Details    Sun Mon Tue Wed Thu Fri Sat        1               2               3               4                 5               6               7               8               9               10               11                 12               13               14               15               16               17               18                 19               20               21               22      7.5 mg   See details      23      7.5 mg         24      10 mg         25      7.5 mg           26      7.5 mg         27      7.5 mg         28      7.5 mg         29      7.5 mg         30      7.5 mg         31      10 mg           Date Details   03/22 This INR check               How to take your warfarin dose     To take:  7.5 mg Take 1.5 of the 5 mg tablets.    To take:  10 mg Take 2 of the 5 mg tablets.           April 2017 Details    Sun Mon Tue Wed Thu Fri Sat           1      7.5 mg           2      7.5 mg         3      7.5 mg         4      7.5 mg         5      7.5 mg         6      7.5 mg         7      10 mg         8      7.5 mg           9      7.5 mg         10      7.5 mg         11      7.5 mg         12            13               14               15                 16               17               18               19               20               21               22                 23               24               25               26                27               28               29                 30                      Date Details   No additional details    Date of next INR:  4/12/2017         How to take your warfarin dose     To take:  7.5 mg Take 1.5 of the 5 mg tablets.    To take:  10 mg Take 2 of the 5 mg tablets.

## 2017-03-23 ASSESSMENT — PATIENT HEALTH QUESTIONNAIRE - PHQ9: SUM OF ALL RESPONSES TO PHQ QUESTIONS 1-9: 3

## 2017-03-23 ASSESSMENT — ANXIETY QUESTIONNAIRES: GAD7 TOTAL SCORE: 1

## 2017-04-12 ENCOUNTER — TELEPHONE (OUTPATIENT)
Dept: FAMILY MEDICINE | Facility: OTHER | Age: 67
End: 2017-04-12

## 2017-04-12 NOTE — TELEPHONE ENCOUNTER
Reason for Call:  Other orders    Detailed comments: Myra calling from  Home Care states requesting updated orders for skilled nursing/eval/treat for home care and pt is ready to start home care services.    Phone Number Patient can be reached at: Other phone number:  [phone]    Best Time: ANY    Can we leave a detailed message on this number? YES    Call taken on 4/12/2017 at 10:11 AM by Yumi Martinez

## 2017-04-13 NOTE — TELEPHONE ENCOUNTER
LM for Myra to return call, when call is returned give ok for orders below per Dr Hernandez.    Shweta Benito CMA ([facility])

## 2017-04-17 ENCOUNTER — TELEPHONE (OUTPATIENT)
Dept: FAMILY MEDICINE | Facility: OTHER | Age: 67
End: 2017-04-17

## 2017-04-17 DIAGNOSIS — R29.898 LEG WEAKNESS, BILATERAL: ICD-10-CM

## 2017-04-17 DIAGNOSIS — R53.1 GENERALIZED WEAKNESS: Primary | ICD-10-CM

## 2017-04-17 DIAGNOSIS — E11.42 DIABETIC PERIPHERAL NEUROPATHY (H): ICD-10-CM

## 2017-04-17 DIAGNOSIS — R29.6 FALLS FREQUENTLY: ICD-10-CM

## 2017-04-17 DIAGNOSIS — I50.23 ACUTE ON CHRONIC SYSTOLIC HEART FAILURE (H): ICD-10-CM

## 2017-04-17 DIAGNOSIS — G89.29 OTHER CHRONIC PAIN: ICD-10-CM

## 2017-04-17 NOTE — TELEPHONE ENCOUNTER
Reason for call:  Other  Reason for Call: Request for an order or referral:    Order or referral being requested: orders    Date needed: as soon as possible    Has the patient been seen by the PCP for this problem? YES    Additional comments: Home care orders physical therapy eval and occupational for Eval        Call taken on 4/17/2017 at 3:55 PM by Makenzie Durbin

## 2017-04-20 ENCOUNTER — TELEPHONE (OUTPATIENT)
Dept: FAMILY MEDICINE | Facility: OTHER | Age: 67
End: 2017-04-20

## 2017-04-20 ENCOUNTER — DOCUMENTATION ONLY (OUTPATIENT)
Dept: CARE COORDINATION | Facility: CLINIC | Age: 67
End: 2017-04-20

## 2017-04-20 DIAGNOSIS — Z53.9 ERRONEOUS ENCOUNTER--DISREGARD: Primary | ICD-10-CM

## 2017-04-20 NOTE — TELEPHONE ENCOUNTER
ICD-10-CM    1. Generalized weakness R53.1 HOME CARE NURSING REFERRAL   2. Falls frequently R29.6 HOME CARE NURSING REFERRAL   3. Leg weakness, bilateral M62.81 HOME CARE NURSING REFERRAL   4. Other chronic pain G89.29 HOME CARE NURSING REFERRAL   5. Diabetic peripheral neuropathy (H) E11.42 HOME CARE NURSING REFERRAL   6. Acute on chronic systolic heart failure (H) I50.23 HOME CARE NURSING REFERRAL       Home care orders physical therapy eval and occupational for Eval.      Electronically signed:  Margi Figueroa M.D.

## 2017-04-20 NOTE — PROGRESS NOTES
LM for Anna to return call, when call is returned give verbal ok for below orders.    Shweta Benito CMA ([facility])

## 2017-04-20 NOTE — PROGRESS NOTES
[facility] now requests orders and shares plan of care/discharge summaries for some patients through [employer].  Please REPLY TO THIS MESSAGE in order to give authorization for orders when needed.  This is considered a verbal order, you will still receive a faxed copy of orders for signature.  Thank you for your assistance in improving collaboration for our patients.    ORDER   PT to eval and treat to include strength and balance.  OT to eval and treat to include equipment needs and cognitive assessment.       MD SUMMARY/PLAN OF CARE  SUMMARY TO MD  SITUATION   Pt referred to home care services to discuss need for PT/OT due to osteoarthritis of right knee. Pt declined SN, HHA, SW at this time. Pt would benefit from SN for education on chronic disease management and INR checks as it is difficult for patient to leave home.   VS  Pt does not always use walker when ambulating. At risk for falls due to unsteady gait and poor balance.   Pt reports having friends close and family that he can contact in time of need. [place]. States friends that live nearby are supportive and help with errands and other needs. Pt also states he has help from a friend with certain ADLs.     BACKGROUND Pt referred to homecare to discuss need for PT/OT for osteoarthritis of right knee  ANALYSIS Pt could benefit from SN visits for chronic disease management, weight monitoring, education related to disease, nutrition/hydration, INRs. Pt declined SN, HHA, SW at this time.   RECOMMENDATION PT eval and treat to include balance and strength. OT eval and treat to include equipment needs and cognitive assessment.     Anna Villegas RN   [facility]  [phone]

## 2017-04-20 NOTE — TELEPHONE ENCOUNTER
Carly from [facility] is calling, patient called his services after the admission visit. If you have any questions please call Carly at [phone].    Thank you Rachel

## 2017-04-21 NOTE — PROGRESS NOTES
Left message to have Anna give us a call back.  When call is returned please give the verbal okay per Dr. Hernandez for the below orders.  Mike Berkowitz, CMA

## 2017-04-21 NOTE — PROGRESS NOTES
Spoke with Anna and informed her of the verbal okay for the orders.  She said pt is now declining.  Mike Berkowitz, MIKE

## 2017-04-21 NOTE — TELEPHONE ENCOUNTER
Called Carly from [facility] and left message.    Electronically signed:  Margi Figueroa M.D.

## 2017-04-27 DIAGNOSIS — R53.82 CHRONIC FATIGUE: ICD-10-CM

## 2017-04-27 DIAGNOSIS — M17.11 PRIMARY OSTEOARTHRITIS OF RIGHT KNEE: ICD-10-CM

## 2017-04-27 DIAGNOSIS — Z86.711 HISTORY OF PULMONARY EMBOLISM: ICD-10-CM

## 2017-04-27 DIAGNOSIS — Z79.01 LONG TERM CURRENT USE OF ANTICOAGULANT THERAPY: ICD-10-CM

## 2017-04-28 RX ORDER — WARFARIN SODIUM 5 MG/1
TABLET ORAL
Qty: 45 TABLET | Refills: 0 | Status: SHIPPED | OUTPATIENT
Start: 2017-04-28

## 2017-04-28 NOTE — TELEPHONE ENCOUNTER
Meloxicam       Last Written Prescription Date: 3/22/2017  Last Fill Quantity: 30, # refills: 30  Last Office Visit with [facility], [facility] or  Health prescribing provider:  3/22/2017        No results found for: URIC]  Creatinine   Date Value Ref Range Status   12/28/2016 0.66 0.66 - 1.25 mg/dL Final   ]  Lab Results   Component Value Date    WBC 8.4 12/28/2016     Lab Results   Component Value Date    RBC 5.20 12/28/2016     Lab Results   Component Value Date    HGB 15.1 12/28/2016     Lab Results   Component Value Date    HCT 45.6 12/28/2016     No components found for: MCT  Lab Results   Component Value Date    MCV 88 12/28/2016     Lab Results   Component Value Date    MCH 29.0 12/28/2016     Lab Results   Component Value Date    MCHC 33.1 12/28/2016     Lab Results   Component Value Date    RDW 14.4 12/28/2016     Lab Results   Component Value Date     12/28/2016     Lab Results   Component Value Date    AST 86 12/28/2016     Lab Results   Component Value Date     12/28/2016     Wellbutrin       Last Written Prescription Date: 3/22/2017  Last Fill Quantity: 90; # refills: 0  Last Office Visit with [facility], [facility] or  Health prescribing provider:  3/22/2017        Last PHQ-9 score on record=   PHQ-9 SCORE 3/22/2017   Total Score 3       Lab Results   Component Value Date    AST 86 12/28/2016     Lab Results   Component Value Date     12/28/2016

## 2017-05-01 RX ORDER — BUPROPION HYDROCHLORIDE 300 MG/1
TABLET ORAL
Qty: 90 TABLET | Refills: 1 | Status: SHIPPED | OUTPATIENT
Start: 2017-05-01

## 2017-05-01 RX ORDER — MELOXICAM 15 MG/1
TABLET ORAL
Qty: 90 TABLET | Refills: 0 | Status: SHIPPED | OUTPATIENT
Start: 2017-05-01

## 2017-05-01 NOTE — TELEPHONE ENCOUNTER
Prescription approved per [facility] Refill Protocol.  Carl Schwartz RN

## 2017-06-08 ENCOUNTER — TELEPHONE (OUTPATIENT)
Dept: FAMILY MEDICINE | Facility: OTHER | Age: 67
End: 2017-06-08

## 2017-06-08 NOTE — TELEPHONE ENCOUNTER
I spoke with pt this morning and informed him he's due for microalbumin, BMP, and A1C labs.  He informed me that he did move to [place] and we start being seen up there.  Will cancel pended labs.  Miek Berkowitz, CMA

## 2017-06-09 ENCOUNTER — ANTICOAGULATION THERAPY VISIT (OUTPATIENT)
Dept: ANTICOAGULATION | Facility: OTHER | Age: 67
End: 2017-06-09

## 2017-06-09 DIAGNOSIS — Z86.711 HISTORY OF PULMONARY EMBOLISM: ICD-10-CM

## 2017-06-09 DIAGNOSIS — Z79.01 LONG-TERM (CURRENT) USE OF ANTICOAGULANTS: ICD-10-CM

## 2017-06-09 NOTE — MR AVS SNAPSHOT
Ric Ferrari   6/9/2017   Anticoagulation Therapy Visit    Description:  66 year old male   Provider:  Margi Figueroa MD   Department:  [facility] Anticoag           INR as of 6/9/2017     Today's INR       Anticoagulation Summary as of 6/9/2017     INR goal 2.0-3.0   Today's INR    Full instructions 10 mg on Fri; 7.5 mg all other days   Next INR check     Indications   Long-term (current) use of anticoagulants [Z79.01] [Z79.01]  History of pulmonary embolism [Z86.711]         Anticoagulation Episode Summary     Resolved date 6/9/2017    Resolved reason Patient moved      Contact Numbers     Clinic Number:

## 2017-08-30 ENCOUNTER — TELEPHONE (OUTPATIENT)
Dept: FAMILY MEDICINE | Facility: OTHER | Age: 67
End: 2017-08-30

## 2017-08-30 NOTE — TELEPHONE ENCOUNTER
Reason for Call:  Other Please call wants to talk to Carl    Detailed comments: He wants to talk to Carl about his health.  Would not give any more info    Phone Number Patient can be reached at: Home number on file [phone] (home)    Best Time: any    Can we leave a detailed message on this number? YES    Call taken on 8/30/2017 at 12:23 PM by Makenzie Durbin

## 2017-08-30 NOTE — TELEPHONE ENCOUNTER
Spoke with patient informed he was on the other line with . Patient will return call to the clinic in a few minutes. Rose Rodriguez RN, BSN

## 2017-08-31 NOTE — TELEPHONE ENCOUNTER
Spoke with patient.   Feel and hit head at [employer]  Went home.  Headaches were getting worse so he went to ED.  Was in hospital last week or so in [place] for one day    Saw normal doctor and he wanted to admit him to a nursing home.  Called the county for an assessment if he needed to be in the nursing home.     States that he moved to [place] and has been seeing a provider there at [facility].  Patient states that he is concerned that his provider in [place] will force him into a nursing home.  Advised patient to work with  and his family on this concern.      States that he has been living at home and keeping a clean house and taking care of himself.    Will follow up with his provider for ongoing care.      Carl Schwartz, RN, BSN

## 2017-08-31 NOTE — TELEPHONE ENCOUNTER
Patient requested to speak with Carl YOO RN. Stated he is aware AM is no longer with [facility]. Would like to know which [place] provider takes [facility] and would like to further discuss concerns with Carl. Rose Rodriguez RN, BSN

## 2017-09-11 ENCOUNTER — TELEPHONE (OUTPATIENT)
Dept: FAMILY MEDICINE | Facility: OTHER | Age: 67
End: 2017-09-11

## 2017-09-11 NOTE — LETTER
[facility]  [id number] [place] [address]  [place] [address]  Phone: [phone]  September 27, 2017      Ric Ferrari  [address] [place] [address]      Dear Ric,    We care about your health and have reviewed your health plan including your medical conditions, medications, and lab results.  Based on this review, it is recommended that you follow up regarding the following health topic(s):  -Diabetes  -Colon Cancer Screening    We recommend you take the following action(s):  -schedule a FOLLOWUP OFFICE APPOINTMENT.  We will perform the following labs:  A1c, Lipids (fasting cholesterol - nothing to eat except water and/or meds for 8-10 hours) and BMP (basic metabolic panel).  -schedule a COLONOSCOPY to look for colon cancer (due every 10 years or 5 years in higher risk situations.)  Colonoscopies can prevent 90-95% of colon cancer deaths.  Problem lesions can be removed before they ever become cancer.  If you do not wish to do a colonoscopy or cannot afford to do one at this time, there is another option called a Fecal Immunochemical Occult Blood Test (FIT) a take home stool sample kit.  It does not replace the colonoscopy for colorectal cancer screening, but it can detect hidden bleeding in the lower colon.  It does need to be repeated every year and if a positive result is obtained, you would be referred for a colonoscopy.  If you have completed either one of these tests at another facility, please have the records sent to our clinic for our records.     Please call us at the [facility] - [phone] (or use [employer]) to address the above recommendations.     Thank you for trusting [facility] and we appreciate the opportunity to serve you.  We look forward to supporting your healthcare needs in the future.    Healthy Regards,    Your Health Care Team  [facility]

## 2017-09-11 NOTE — TELEPHONE ENCOUNTER
Summary:    Patient is due/failing the following:   BMP,Microalbumin, A1C, FOLLOW UP, FIT and PHQ9    Action needed:   Patient needs office visit for diabetic follow up. and Patient needs non-fasting lab only appointment    Type of outreach:    Phone, left message for patient to call back.     Questions for provider review:    None                                                                                                                                    Rose Marie Burdick       Chart routed to Care Team .      Panel Management Review      Patient has the following on his problem list:     Depression / Dysthymia review  PHQ-9 SCORE 12/28/2016 1/25/2017 3/22/2017   Total Score - - -   Total Score 5 5 3      Patient is due for:  PHQ9    Diabetes    ASA: Passed    Last A1C  Lab Results   Component Value Date    A1C 6.6 12/28/2016    A1C 6.5 05/05/2016    A1C 7.2 11/17/2015    A1C 6.5 07/23/2015    A1C 4.9 02/03/2015     A1C tested: Passed    Last LDL:    Lab Results   Component Value Date    CHOL 206 12/28/2016     Lab Results   Component Value Date    HDL 39 12/28/2016     Lab Results   Component Value Date     12/28/2016     Lab Results   Component Value Date    TRIG 244 12/28/2016     Lab Results   Component Value Date    CHOLHDLRATIO 4.8 09/01/2015     Lab Results   Component Value Date    NHDL 167 12/28/2016       Is the patient on a Statin? YES             Is the patient on Aspirin? YES    Medications     HMG CoA Reductase Inhibitors    atorvastatin (LIPITOR) 10 MG tablet    Salicylates    aspirin EC 81 MG EC tablet          Last three blood pressure readings:  BP Readings from Last 3 Encounters:   03/22/17 110/74   01/25/17 110/62   01/18/17 116/70            Tobacco History:     History   Smoking Status     Former Smoker     Types: Cigars     Quit date: 10/1/2013   Smokeless Tobacco     Never Used     Comment: 1 cigar a week               Composite cancer screening  Chart review shows that this  patient is due/due soon for the following Fecal Colorectal (FIT)

## 2017-09-30 ENCOUNTER — NURSE TRIAGE (OUTPATIENT)
Dept: NURSING | Facility: CLINIC | Age: 67
End: 2017-09-30

## 2017-09-30 NOTE — TELEPHONE ENCOUNTER
Ric called asking for Clinics names and phone numbers for sports medicine. I gave him this information.   Afua BOUDREAUX RN [facility]

## 2021-05-30 ENCOUNTER — RECORDS - HEALTHEAST (OUTPATIENT)
Dept: ADMINISTRATIVE | Facility: CLINIC | Age: 71
End: 2021-05-30

## 2021-09-14 ENCOUNTER — LAB REQUISITION (OUTPATIENT)
Dept: LAB | Facility: CLINIC | Age: 71
End: 2021-09-14
Payer: MEDICARE

## 2021-09-14 DIAGNOSIS — Z03.818 ENCOUNTER FOR OBSERVATION FOR SUSPECTED EXPOSURE TO OTHER BIOLOGICAL AGENTS RULED OUT: ICD-10-CM

## 2021-09-14 PROCEDURE — U0005 INFEC AGEN DETEC AMPLI PROBE: HCPCS | Mod: ORL | Performed by: FAMILY MEDICINE

## 2021-09-15 LAB — SARS-COV-2 RNA RESP QL NAA+PROBE: NEGATIVE

## 2021-09-20 ENCOUNTER — LAB REQUISITION (OUTPATIENT)
Dept: LAB | Facility: CLINIC | Age: 71
End: 2021-09-20
Payer: MEDICARE

## 2021-09-20 DIAGNOSIS — Z03.818 ENCOUNTER FOR OBSERVATION FOR SUSPECTED EXPOSURE TO OTHER BIOLOGICAL AGENTS RULED OUT: ICD-10-CM

## 2021-09-21 PROCEDURE — U0003 INFECTIOUS AGENT DETECTION BY NUCLEIC ACID (DNA OR RNA); SEVERE ACUTE RESPIRATORY SYNDROME CORONAVIRUS 2 (SARS-COV-2) (CORONAVIRUS DISEASE [COVID-19]), AMPLIFIED PROBE TECHNIQUE, MAKING USE OF HIGH THROUGHPUT TECHNOLOGIES AS DESCRIBED BY CMS-2020-01-R: HCPCS | Mod: ORL | Performed by: FAMILY MEDICINE

## 2021-09-24 LAB — SARS-COV-2 RNA RESP QL NAA+PROBE: NOT DETECTED
